# Patient Record
Sex: FEMALE | Race: WHITE | NOT HISPANIC OR LATINO | Employment: OTHER | URBAN - METROPOLITAN AREA
[De-identification: names, ages, dates, MRNs, and addresses within clinical notes are randomized per-mention and may not be internally consistent; named-entity substitution may affect disease eponyms.]

---

## 2020-04-02 ENCOUNTER — TRANSFERRED RECORDS (OUTPATIENT)
Dept: HEALTH INFORMATION MANAGEMENT | Facility: CLINIC | Age: 80
End: 2020-04-02

## 2020-10-06 ENCOUNTER — TRANSFERRED RECORDS (OUTPATIENT)
Dept: HEALTH INFORMATION MANAGEMENT | Facility: CLINIC | Age: 80
End: 2020-10-06

## 2020-10-15 ENCOUNTER — TELEPHONE (OUTPATIENT)
Dept: PSYCHIATRY | Facility: CLINIC | Age: 80
End: 2020-10-15

## 2020-10-15 NOTE — TELEPHONE ENCOUNTER
Who is referring the patient?  at [facility]    What is the indication for ECT? Clinical Depression    Has the patient had ECT before? Yes   Where was your previous ECT done? [place], [place]   When was your previous ECT done? Unknown, possibly 2006   How many sessions was it? Unknown   Was it Bilateral or Unilateral? unknown   How well did the ECT work? Worked really well   Where there any problems? No    How likely are they to want to start ECT after the evaluation? Very Likely    Does the patient have any of the following red flag symptoms? There's a little bit of thoughts of self-harm. No plan or intent    If the patient has not had ECT before, please read the following:   Explain that it is a series of treatments, usually 6-12 done 2-3 times week for 2-4 weeks. No driving during the series of ECT, so must have a ride to and from the hospital and someone that will be with them for several hours after each treatment. All patients will need a physical exam and labs before doing ECT, usually after the evaluation, but if the situation is urgent and the patient is in good general health, they can get instructions about what to do before the ECT eval.    Who will be coming with them to the eval? Son - Reno  All patients are encouraged to bring whoever is likely to be providing most of the care during ECT (driving and staying with patient) to the initial appointment.    During the call, ask briefly about suicidal thinking and remind patient or caregiver that if the patient should be evaluated urgently, go to the ED. If admitted, ECT can be started more quickly than as an outpatient and most patients can be discharged when safe and finish their treatment as an outpatient. If they are admitted to a hospitalist, they should ask the doctor to refer for ECT to Dr. Mcbride.    10/15/20 Intake complete. Sending to  for review. Also sending fax referral and records to scannin pages.     Shayna Buckner, intake  coordinator

## 2021-11-12 ENCOUNTER — TRANSFERRED RECORDS (OUTPATIENT)
Dept: HEALTH INFORMATION MANAGEMENT | Facility: CLINIC | Age: 81
End: 2021-11-12
Payer: MEDICARE

## 2021-11-15 ENCOUNTER — PATIENT OUTREACH (OUTPATIENT)
Dept: ONCOLOGY | Facility: CLINIC | Age: 81
End: 2021-11-15
Payer: MEDICARE

## 2021-11-15 ENCOUNTER — TRANSCRIBE ORDERS (OUTPATIENT)
Dept: OTHER | Age: 81
End: 2021-11-15
Payer: MEDICARE

## 2021-11-15 DIAGNOSIS — C50.912 INVASIVE DUCTAL CARCINOMA OF LEFT BREAST (H): Primary | ICD-10-CM

## 2021-11-15 NOTE — PROGRESS NOTES
New Patient Oncology Nurse Navigator Note     Referring provider: Refer by [facility]      Referring Clinic/Organization: Refer by [facility]      Referred to (specialty: Medical Oncology      Date Referral Received: November 15, 2021     Evaluation for:  Breast cancer     Clinical History (per Nurse review of records provided):    7/22/2021 bilateral screening 3D mammogram: Round mass now presents slightly lower slightly inner portion of left breast.    8/19/2021 ultrasound left breast: Hypoechoic marginated wider than tall shadowing vascular mass at the 8 to 9 o'clock position 3 cm from the nipple 1.9 x 2.2 x 1.1 cm.    9/1/21 left breast biopsy: Invasive mammary carcinoma, maximum size in 1 core is 10 mm, no in situ component. ER+> 90%, PA+> 90%, HER-2 positive IHC 3+.    9/20/2021: Saw Dr. Weaver for surgical consult.    9/28/2021 - Patient met with Dr. Connie Lozano, medical oncologist.  Dr. Lozano recommended patient proceed with surgery and also have radiation therapy.  Dr. Lozano states patient has altered chest anatomy with pectus excavatum.  Dr. Lozano also referred patient to Dr. Valentina Lynch for adjuvant radiation consult.  According to patient's son that did not occur.    10/22/2021: Left simple mastectomy, sentinel lymph node biopsy by Dr. Jennie Weaver. Pathology: 18 mm invasive ductal carcinoma, grade 2. Distance from closest margin 10 mm,3 sentinel lymph nodes negative. Ki-67 7 percent.    11/12/21 - [facility]    11/12/2021 - Met with Dr. Connie Lozano again and discussed adjuvant paclitaxel weekly x12 concurrent with Herceptin for a total of 1 year, as well as adjuvant hormonal therapy.  Dr. Lozano assessed patient as somewhat frail and will not tolerate chemotherapy and Herceptin well.  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  She did have a lot of mental health issues before surgery with depression, was even admitted for depression on October 2 at [facility].      She is moving to assisted living facility in [place] and thus transfer of care to [facility].     Records Location: Care Everywhere     Telephoned and left voice message on Alyssa' mobile number requesting call back at her convenience.

## 2021-11-17 NOTE — PROGRESS NOTES
Telephoned and left voice message at patient's home number requesting call back to assist in scheduling medical oncology consult at our [place] location.      Telephoned and left voice message at patient's mobile number as well, again requesting call back to assist in scheduling medical oncology consult at our [place] location.

## 2021-11-18 NOTE — PROGRESS NOTES
RECORDS STATUS - BREAST    RECORDS REQUESTED FROM: [facility]/ [facility]   DATE REQUESTED: 11/19/2021   NOTES DETAILS STATUS   OFFICE NOTE from referring provider     OFFICE NOTE from medical oncologist Complete 11/12/2021- Malignant neoplasm of upper-inner quadrant of left breast in female, estrogen receptor positive (HCC) (Primary Dx)   OFFICE NOTE from surgeon Complete See Breast Biopsy in EPIC   OFFICE NOTE from radiation oncologist     DISCHARGE SUMMARY from hospital Complete 9/28/2021- Consult- Malignant neoplasm of upper-inner quadrant of left breast in female   DISCHARGE REPORT from the ER     OPERATIVE REPORT Complete See Breast Biopsy in EPIC   MEDICATION LIST     CLINICAL TRIAL TREATMENTS TO DATE     LABS     REQUEST BLOCKS FOR ALL BREAST CANCER PTS     PATHOLOGY REPORTS  (Tissue diagnosis, Stage, ER/CT percentage positive and intensity of staining, HER2 IHC, FISH, and all biopsies from breast and any distant metastasis)                 Requested- Biopsy from 9/1/2021 [facility]   Case: [id number]  Ph: [phone]  Fax: [phone]  Mailing Address:  [address] [place] [address]  [employer] Tracking Number:  [id number] 10/22/2021   B.  Skin, left breast, inferior lateral margin, excision:  Benign skin and subcutaneous tissue with areas of seborrheic keratosis, negative for dysplasia or evidence of neoplasm.     C.  Breast, left, mastectomy:  Invasive mammary carcinoma.  See Synoptic Report.      - Skin with multiple areas of benign keratosis, negative for dysplasia or involvement by mammary carcinoma.      - Unremarkable nipple.     D.  Skin, left breast, lateral margin, excision:  Benign skin and subcutaneous tissue negative for dysplasia or involvement by mammary carcinoma.     9/1/2021   Breast, left, biopsy:  Invasive mammary carcinoma, NOS.       - Maximum size of invasive tumor in one core is 10 mm.      - No in situ component identified.         GENONOMIC TESTING     TYPE:   (Next  Generation Sequencing, including Foundation One testing, and Oncotype score)     IMAGING (NEED IMAGES & REPORT)     CT SCANS     MRI     MAMMO Requested- [facility]     ULTRASOUND Requested- [facility]     PET     BONE SCAN     BRAIN MRI       Action    Action Taken 11/18/2021 2:17pm CATHY     I called [facility] Ph: [phone] #[phone] - they will send breast/chest imaging from [phone]    I faxed over a request for path to [facility]     I called [facility] in  [place] Phone: [phone] to follow up on the image request again..they will push a few images     Most of the imaging is at the [facility] Ph: [phone]- they asked me to fax over a request form.  Fax: [phone]

## 2021-11-18 NOTE — PROGRESS NOTES
Alyssa returned my call and confirms she has moved to assisted living ([facility]) in [place].  She believes she has transportation to medical appointments through [employer] in [place], and states her son Reno typically accompanies her to appointments.  She gave permission for writer to call son to arrange appointment time.      Telephoned son's mobile number and spoke with him about convenient time for consult.  They are available tomorrow and son transferred to [facility]PS to book appointment with Dr. Anguiano for 11/19.

## 2021-11-19 ENCOUNTER — ONCOLOGY VISIT (OUTPATIENT)
Dept: ONCOLOGY | Facility: CLINIC | Age: 81
End: 2021-11-19
Payer: MEDICARE

## 2021-11-19 ENCOUNTER — PRE VISIT (OUTPATIENT)
Dept: ONCOLOGY | Facility: CLINIC | Age: 81
End: 2021-11-19

## 2021-11-19 VITALS
DIASTOLIC BLOOD PRESSURE: 86 MMHG | SYSTOLIC BLOOD PRESSURE: 151 MMHG | OXYGEN SATURATION: 94 % | HEART RATE: 89 BPM | HEIGHT: 60 IN | RESPIRATION RATE: 16 BRPM | BODY MASS INDEX: 26.31 KG/M2 | WEIGHT: 134 LBS

## 2021-11-19 DIAGNOSIS — C50.212 MALIGNANT NEOPLASM OF UPPER-INNER QUADRANT OF LEFT BREAST IN FEMALE, ESTROGEN RECEPTOR POSITIVE (H): Primary | ICD-10-CM

## 2021-11-19 DIAGNOSIS — Z17.0 MALIGNANT NEOPLASM OF UPPER-INNER QUADRANT OF LEFT BREAST IN FEMALE, ESTROGEN RECEPTOR POSITIVE (H): Primary | ICD-10-CM

## 2021-11-19 PROBLEM — Z96.649 HIP JOINT REPLACEMENT BY OTHER MEANS: Status: ACTIVE | Noted: 2021-11-19

## 2021-11-19 PROBLEM — M85.80 OSTEOPENIA: Status: ACTIVE | Noted: 2021-10-05

## 2021-11-19 PROBLEM — I10 ESSENTIAL HYPERTENSION: Status: ACTIVE | Noted: 2018-03-07

## 2021-11-19 PROBLEM — M19.049 PRIMARY LOCALIZED OSTEOARTHROSIS, HAND: Status: ACTIVE | Noted: 2021-11-19

## 2021-11-19 PROBLEM — Z90.12 S/P LEFT MASTECTOMY: Status: ACTIVE | Noted: 2021-10-28

## 2021-11-19 LAB
ALBUMIN SERPL-MCNC: 3.5 G/DL (ref 3.4–5)
ALP SERPL-CCNC: 75 U/L (ref 40–150)
ALT SERPL W P-5'-P-CCNC: 23 U/L (ref 0–50)
ANION GAP SERPL CALCULATED.3IONS-SCNC: 3 MMOL/L (ref 3–14)
AST SERPL W P-5'-P-CCNC: 27 U/L (ref 0–45)
BASOPHILS # BLD AUTO: 0.1 10E3/UL (ref 0–0.2)
BASOPHILS NFR BLD AUTO: 1 %
BILIRUB SERPL-MCNC: 0.3 MG/DL (ref 0.2–1.3)
BUN SERPL-MCNC: 34 MG/DL (ref 7–30)
CALCIUM SERPL-MCNC: 9.1 MG/DL (ref 8.5–10.1)
CHLORIDE BLD-SCNC: 104 MMOL/L (ref 94–109)
CO2 SERPL-SCNC: 33 MMOL/L (ref 20–32)
CREAT SERPL-MCNC: 0.9 MG/DL (ref 0.52–1.04)
EOSINOPHIL # BLD AUTO: 0.3 10E3/UL (ref 0–0.7)
EOSINOPHIL NFR BLD AUTO: 3 %
ERYTHROCYTE [DISTWIDTH] IN BLOOD BY AUTOMATED COUNT: 13.3 % (ref 10–15)
GFR SERPL CREATININE-BSD FRML MDRD: 60 ML/MIN/1.73M2
GLUCOSE BLD-MCNC: 92 MG/DL (ref 70–99)
HCT VFR BLD AUTO: 38 % (ref 35–47)
HGB BLD-MCNC: 12.4 G/DL (ref 11.7–15.7)
HOLD SPECIMEN: NORMAL
IMM GRANULOCYTES # BLD: 0 10E3/UL
IMM GRANULOCYTES NFR BLD: 1 %
LYMPHOCYTES # BLD AUTO: 1.7 10E3/UL (ref 0.8–5.3)
LYMPHOCYTES NFR BLD AUTO: 20 %
MCH RBC QN AUTO: 30.6 PG (ref 26.5–33)
MCHC RBC AUTO-ENTMCNC: 32.6 G/DL (ref 31.5–36.5)
MCV RBC AUTO: 94 FL (ref 78–100)
MONOCYTES # BLD AUTO: 1 10E3/UL (ref 0–1.3)
MONOCYTES NFR BLD AUTO: 11 %
NEUTROPHILS # BLD AUTO: 5.7 10E3/UL (ref 1.6–8.3)
NEUTROPHILS NFR BLD AUTO: 64 %
NRBC # BLD AUTO: 0 10E3/UL
NRBC BLD AUTO-RTO: 0 /100
PLATELET # BLD AUTO: 161 10E3/UL (ref 150–450)
POTASSIUM BLD-SCNC: 4.5 MMOL/L (ref 3.4–5.3)
PROT SERPL-MCNC: 7.1 G/DL (ref 6.8–8.8)
RBC # BLD AUTO: 4.05 10E6/UL (ref 3.8–5.2)
SODIUM SERPL-SCNC: 140 MMOL/L (ref 133–144)
WBC # BLD AUTO: 8.7 10E3/UL (ref 4–11)

## 2021-11-19 PROCEDURE — 80053 COMPREHEN METABOLIC PANEL: CPT | Performed by: INTERNAL MEDICINE

## 2021-11-19 PROCEDURE — 85025 COMPLETE CBC W/AUTO DIFF WBC: CPT | Performed by: INTERNAL MEDICINE

## 2021-11-19 PROCEDURE — 36415 COLL VENOUS BLD VENIPUNCTURE: CPT | Performed by: INTERNAL MEDICINE

## 2021-11-19 PROCEDURE — 99204 OFFICE O/P NEW MOD 45 MIN: CPT | Performed by: INTERNAL MEDICINE

## 2021-11-19 RX ORDER — ACETAMINOPHEN 325 MG/1
650 TABLET ORAL
COMMUNITY
Start: 2021-10-29

## 2021-11-19 RX ORDER — ONDANSETRON 2 MG/ML
8 INJECTION INTRAMUSCULAR; INTRAVENOUS EVERY 6 HOURS PRN
Status: CANCELLED
Start: 2021-12-27

## 2021-11-19 RX ORDER — FUROSEMIDE 20 MG
10 TABLET ORAL
COMMUNITY
Start: 2021-07-21

## 2021-11-19 RX ORDER — HEPARIN SODIUM,PORCINE 10 UNIT/ML
5 VIAL (ML) INTRAVENOUS
Status: CANCELLED | OUTPATIENT
Start: 2021-12-27

## 2021-11-19 RX ORDER — MIRTAZAPINE 15 MG/1
TABLET, FILM COATED ORAL
COMMUNITY
Start: 2021-11-15

## 2021-11-19 RX ORDER — DIPHENHYDRAMINE HCL 25 MG
50 CAPSULE ORAL ONCE
Status: CANCELLED
Start: 2021-12-27

## 2021-11-19 RX ORDER — HEPARIN SODIUM (PORCINE) LOCK FLUSH IV SOLN 100 UNIT/ML 100 UNIT/ML
5 SOLUTION INTRAVENOUS
Status: CANCELLED | OUTPATIENT
Start: 2021-12-27

## 2021-11-19 RX ORDER — AMLODIPINE BESYLATE 5 MG/1
TABLET ORAL
COMMUNITY
Start: 2020-11-09

## 2021-11-19 RX ORDER — ACETAMINOPHEN 325 MG/1
650 TABLET ORAL ONCE
Status: CANCELLED | OUTPATIENT
Start: 2021-12-27

## 2021-11-19 RX ORDER — METHYLPREDNISOLONE SODIUM SUCCINATE 125 MG/2ML
125 INJECTION, POWDER, LYOPHILIZED, FOR SOLUTION INTRAMUSCULAR; INTRAVENOUS
Status: CANCELLED
Start: 2021-12-27

## 2021-11-19 RX ORDER — EPINEPHRINE 1 MG/ML
0.3 INJECTION, SOLUTION INTRAMUSCULAR; SUBCUTANEOUS EVERY 5 MIN PRN
Status: CANCELLED | OUTPATIENT
Start: 2021-12-27

## 2021-11-19 RX ORDER — MULTIVIT-MIN/FOLIC ACID/BIOTIN 400-400MCG
1 CAPSULE ORAL DAILY
COMMUNITY
Start: 2020-11-09

## 2021-11-19 RX ORDER — ALBUTEROL SULFATE 0.83 MG/ML
2.5 SOLUTION RESPIRATORY (INHALATION)
Status: CANCELLED | OUTPATIENT
Start: 2021-12-27

## 2021-11-19 RX ORDER — VENLAFAXINE HYDROCHLORIDE 150 MG/1
300 CAPSULE, EXTENDED RELEASE ORAL
COMMUNITY
Start: 2020-11-17

## 2021-11-19 RX ORDER — NALOXONE HYDROCHLORIDE 0.4 MG/ML
0.2 INJECTION, SOLUTION INTRAMUSCULAR; INTRAVENOUS; SUBCUTANEOUS
Status: CANCELLED | OUTPATIENT
Start: 2021-12-27

## 2021-11-19 RX ORDER — RALOXIFENE HYDROCHLORIDE 60 MG/1
60 TABLET, FILM COATED ORAL
COMMUNITY
Start: 2020-09-14 | End: 2024-01-11

## 2021-11-19 RX ORDER — ALBUTEROL SULFATE 90 UG/1
1-2 AEROSOL, METERED RESPIRATORY (INHALATION)
Status: CANCELLED
Start: 2021-12-27

## 2021-11-19 RX ORDER — DIPHENHYDRAMINE HYDROCHLORIDE 50 MG/ML
50 INJECTION INTRAMUSCULAR; INTRAVENOUS
Status: CANCELLED
Start: 2021-12-27

## 2021-11-19 RX ORDER — MEPERIDINE HYDROCHLORIDE 25 MG/ML
25 INJECTION INTRAMUSCULAR; INTRAVENOUS; SUBCUTANEOUS EVERY 30 MIN PRN
Status: CANCELLED | OUTPATIENT
Start: 2021-12-27

## 2021-11-19 RX ORDER — AMOXICILLIN 500 MG/1
CAPSULE ORAL
COMMUNITY
Start: 2020-07-21

## 2021-11-19 RX ORDER — ARIPIPRAZOLE 5 MG/1
2.5 TABLET ORAL
COMMUNITY
Start: 2021-09-20

## 2021-11-19 ASSESSMENT — PAIN SCALES - GENERAL: PAINLEVEL: NO PAIN (0)

## 2021-11-19 ASSESSMENT — MIFFLIN-ST. JEOR: SCORE: 994.32

## 2021-11-19 NOTE — PROGRESS NOTES
[facility] PHYS[facility]IANS  MEDICAL ONCOLOGY    NEW PATIENT VISIT NOTE    Reason for consultation: breast cancer    Referring Provider: self referred    Oncology Treatment Summary  1. 7/22/21 screening mammogram with Left breast mass in the LIQ  2. 8/19/21 ultrasound with 1.9 x 2.2 1.1 cm mass, bx invasive mammary cancer  ER90%+ NM 90%+ Her2 grisel 3+ IHC  3. 10/22/21 Left mastectomy 18 mm IDC G2, margins negative, 0/3 SLN. Ki 67 7%  4. Germline genetic testing done in [place], results pending.  5. Echo in [place] done with EF 70%     HISTORY OF PRESENTING ILLNESS  Pleasant 81 year old presents with son to discuss her newly dx breast cancer. She has met with Dr Larry Michaels in [place] and therapy has been recommended. She moved to the [facility] to live in assisted living and would like to receive therapy here.  She is active at the [facility]. She goes to meals. She plays piano. She is not terribly physically active. She uses a walker. No pain. No cough or shortness of breath.     PAST MEDICAL HISTORY  Depression - major, osteopenia, pectus excavatum, RIGHT THR      CURRENT OUTPATIENT MEDICATIONS  Current Outpatient Medications   Medication     acetaminophen (TYLENOL) 325 MG tablet     amLODIPine (NORVASC) 5 MG tablet     amoxicillin (AMOXIL) 500 MG capsule     ARIPiprazole (ABILIFY) 5 MG tablet     calcium carbonate 600 mg-vitamin D 400 units (CALTRATE) 600-400 MG-UNIT per tablet     cholecalciferol 25 MCG (1000 UT) TABS     furosemide (LASIX) 20 MG tablet     mirtazapine (REMERON) 15 MG tablet     Multiple Vitamins-Minerals (OCUVITE-LUTEIN PO)     raloxifene (EVISTA) 60 MG tablet     Specialty Vitamins Products (VITAMINS FOR HAIR) CAPS     venlafaxine (EFFEXOR-XR) 150 MG 24 hr capsule     No current facility-administered medications for this visit.        ALLERGIES   No Known Allergies     SOCIAL HISTORY  , recently moved to area. 3 children Reno, Leonila, Norma. 6 grand kids. No tobacco rare  etoh.     FAMILY HISTORY  See genetic counselor note.     REVIEW OF SYSTEMS  Review Of Systems  Skin: negative  Eyes: negative  Ears/Nose/Throat: negative  Respiratory: No shortness of breath, dyspnea on exertion, cough, or hemoptysis  Cardiovascular: negative  Gastrointestinal: negative  Genitourinary: negative  Musculoskeletal: negative  Neurologic: negative  Psychiatric: negative  Hematologic/Lymphatic/Immunologic: negative  Endocrine: negative      PHYSICAL EXAM  B/P: 151/86, T: Data Unavailable, P: 89, R: 16  Wt Readings from Last 3 Encounters:   11/19/21 60.8 kg (134 lb)       ECOG PPS1        Physical Exam  Vitals reviewed.   Constitutional:       Appearance: Normal appearance. She is normal weight.   HENT:      Head: Normocephalic and atraumatic.   Eyes:      Extraocular Movements: Extraocular movements intact.      Conjunctiva/sclera: Conjunctivae normal.      Pupils: Pupils are equal, round, and reactive to light.   Pulmonary:      Effort: Pulmonary effort is normal.   Skin:     General: Skin is warm.   Neurological:      General: No focal deficit present.      Mental Status: She is alert and oriented to person, place, and time. Mental status is at baseline.   Psychiatric:         Behavior: Behavior normal.         Thought Content: Thought content normal.         Judgment: Judgment normal.              LABORATORY AND IMAGING STUDIES  Recent Labs   Lab Test 11/19/21  1230      POTASSIUM 4.5   CHLORIDE 104     No results for input(s): MAG, PHOS in the last 40430 hours.  Recent Labs   Lab Test 11/19/21  1230   WBC 8.7   HGB 12.4      MCV 94   NEUTROPHIL 64     No results for input(s): BILITOTAL, ALKPHOS, ALT, AST, ALBUMIN, LDH in the last 67903 hours.  No results found for: TSH  No results for input(s): CEA in the last 90541 hours.  No results found for this or any previous visit.     ASSESSMENT  AND RECOMMENDATIONS:    IMP    1. [id number] ER+CA+[id number] breast cancer of the left breast  s/p mastectomy  2. Major Depression      Plan    I discussed treatment options with her and her son. I would concur with Dr Marques regarding treatment.    She has had a mastectomy and does not need post mastectomy radiation.    Given her T1c Her2+ cancer I would recommend adjuvant taxol + herceptin for 12 weeks followed by herceptin for 1 year. We discussed risks, benefits. Alternative and side effects and she does wish to proceed. At the end of taxol we will discuss antiestrogen therapy. She has had a echocardiogram with EF 70%. She will need a port. We will see her every other week of chemotherapy. Labs will be done today.    Marjorie Anguiano MD on 11/19/2021 at 2:47 PM

## 2021-11-19 NOTE — LETTER
11/19/2021         RE: Alyssa Aparicio  [address]  [place] [facility] [address]        Dear Colleague,    Thank you for referring your patient, Alyssa Aparicio, to the [facility]. Please see a copy of my visit note below.    [facility] PHYSICIANS  MEDICAL ONCOLOGY    NEW PATIENT VISIT NOTE    Reason for consultation: breast cancer    Referring Provider: self referred    Oncology Treatment Summary  1. 7/22/21 screening mammogram with Left breast mass in the LIQ  2. 8/19/21 ultrasound with 1.9 x 2.2 1.1 cm mass, bx invasive mammary cancer  ER90%+ WA 90%+ Her2 grisel 3+ IHC  3. 10/22/21 Left mastectomy 18 mm IDC G2, margins negative, 0/3 SLN. Ki 67 7%  4. Germline genetic testing done in [place], results pending.  5. Echo in [place] done with EF 70%     HISTORY OF PRESENTING ILLNESS  Pleasant 81 year old presents with son to discuss her newly dx breast cancer. She has met with Dr Larry Michaels in [place] and therapy has been recommended. She moved to the  area to live in assisted living and would like to receive therapy here.  She is active at the [facility]. She goes to meals. She plays piano. She is not terribly physically active. She uses a walker. No pain. No cough or shortness of breath.     PAST MEDICAL HISTORY  Depression - major, osteopenia, pectus excavatum, RIGHT THR      CURRENT OUTPATIENT MEDICATIONS  Current Outpatient Medications   Medication     acetaminophen (TYLENOL) 325 MG tablet     amLODIPine (NORVASC) 5 MG tablet     amoxicillin (AMOXIL) 500 MG capsule     ARIPiprazole (ABILIFY) 5 MG tablet     calcium carbonate 600 mg-vitamin D 400 units (CALTRATE) 600-400 MG-UNIT per tablet     cholecalciferol 25 MCG (1000 UT) TABS     furosemide (LASIX) 20 MG tablet     mirtazapine (REMERON) 15 MG tablet     Multiple Vitamins-Minerals (OCUVITE-LUTEIN PO)     raloxifene (EVISTA) 60 MG tablet     Specialty Vitamins Products  (VITAMINS FOR HAIR) CAPS     venlafaxine (EFFEXOR-XR) 150 MG 24 hr capsule     No current facility-administered medications for this visit.        ALLERGIES   No Known Allergies     SOCIAL HISTORY  , recently moved to area. 3 children Reno, Norma Keen. 6 grand kids. No tobacco rare etoh.     FAMILY HISTORY  See genetic counselor note.     REVIEW OF SYSTEMS  Review Of Systems  Skin: negative  Eyes: negative  Ears/Nose/Throat: negative  Respiratory: No shortness of breath, dyspnea on exertion, cough, or hemoptysis  Cardiovascular: negative  Gastrointestinal: negative  Genitourinary: negative  Musculoskeletal: negative  Neurologic: negative  Psychiatric: negative  Hematologic/Lymphatic/Immunologic: negative  Endocrine: negative      PHYSICAL EXAM  B/P: 151/86, T: Data Unavailable, P: 89, R: 16  Wt Readings from Last 3 Encounters:   11/19/21 60.8 kg (134 lb)       ECOG PPS1        Physical Exam  Vitals reviewed.   Constitutional:       Appearance: Normal appearance. She is normal weight.   HENT:      Head: Normocephalic and atraumatic.   Eyes:      Extraocular Movements: Extraocular movements intact.      Conjunctiva/sclera: Conjunctivae normal.      Pupils: Pupils are equal, round, and reactive to light.   Pulmonary:      Effort: Pulmonary effort is normal.   Skin:     General: Skin is warm.   Neurological:      General: No focal deficit present.      Mental Status: She is alert and oriented to person, place, and time. Mental status is at baseline.   Psychiatric:         Behavior: Behavior normal.         Thought Content: Thought content normal.         Judgment: Judgment normal.              LABORATORY AND IMAGING STUDIES  Recent Labs   Lab Test 11/19/21  1230      POTASSIUM 4.5   CHLORIDE 104     No results for input(s): MAG, PHOS in the last 90657 hours.  Recent Labs   Lab Test 11/19/21  1230   WBC 8.7   HGB 12.4      MCV 94   NEUTROPHIL 64     No results for input(s): BILITOTAL, ALKPHOS, ALT,  AST, ALBUMIN, LDH in the last 98792 hours.  No results found for: TSH  No results for input(s): CEA in the last 66953 hours.  No results found for this or any previous visit.     ASSESSMENT  AND RECOMMENDATIONS:    IMP    1. [id number] ER+WA+[id number] breast cancer of the left breast s/p mastectomy  2. Major Depression      Plan    I discussed treatment options with her and her son. I would concur with Dr Marques regarding treatment.    She has had a mastectomy and does not need post mastectomy radiation.    Given her T1c Her2+ cancer I would recommend adjuvant taxol + herceptin for 12 weeks followed by herceptin for 1 year. We discussed risks, benefits. Alternative and side effects and she does wish to proceed. At the end of taxol we will discuss antiestrogen therapy. She has had a echocardiogram with EF 70%. She will need a port. We will see her every other week of chemotherapy. Labs will be done today.    Marjorie Anguiano MD on 11/19/2021 at 2:47 PM              Again, thank you for allowing me to participate in the care of your patient.        Sincerely,        Marjorie Anguiano MD

## 2021-11-19 NOTE — NURSING NOTE
Oncology Rooming Note    November 19, 2021 11:23 AM   Alyssa Aparicio is a 81 year old female who presents for:    Chief Complaint   Patient presents with     Oncology Clinic Visit     New patient     Initial Vitals: BP (!) 151/86 (BP Location: Left arm)   Pulse 89   Resp 16   Ht 1.524 m (5')   Wt 60.8 kg (134 lb)   SpO2 94%   BMI 26.17 kg/m   Estimated body mass index is 26.17 kg/m  as calculated from the following:    Height as of this encounter: 1.524 m (5').    Weight as of this encounter: 60.8 kg (134 lb). Body surface area is 1.6 meters squared.  No Pain (0) Comment: Data Unavailable   No LMP recorded. Patient is postmenopausal.  Allergies reviewed: Yes  Medications reviewed: Yes    Medications: Medication refills not needed today.  Pharmacy name entered into EPIC: Data Unavailable    Clinical concerns: New Patient       Anabelle Redd LPN

## 2021-11-24 ENCOUNTER — LAB (OUTPATIENT)
Dept: LAB | Facility: CLINIC | Age: 81
End: 2021-11-24
Payer: MEDICARE

## 2021-11-24 DIAGNOSIS — Z17.0 MALIGNANT NEOPLASM OF UPPER-INNER QUADRANT OF LEFT BREAST IN FEMALE, ESTROGEN RECEPTOR POSITIVE (H): Primary | ICD-10-CM

## 2021-11-24 DIAGNOSIS — C50.212 MALIGNANT NEOPLASM OF UPPER-INNER QUADRANT OF LEFT BREAST IN FEMALE, ESTROGEN RECEPTOR POSITIVE (H): Primary | ICD-10-CM

## 2021-11-24 PROCEDURE — 88323 CONSLTJ&REPRT MATRL PREP SLD: CPT | Mod: TC

## 2021-11-24 PROCEDURE — 88321 CONSLTJ&REPRT SLD PREP ELSWR: CPT | Performed by: PATHOLOGY

## 2021-12-03 ENCOUNTER — ONCOLOGY VISIT (OUTPATIENT)
Dept: ONCOLOGY | Facility: CLINIC | Age: 81
End: 2021-12-03
Payer: MEDICARE

## 2021-12-03 VITALS
RESPIRATION RATE: 16 BRPM | BODY MASS INDEX: 27.05 KG/M2 | HEART RATE: 86 BPM | SYSTOLIC BLOOD PRESSURE: 154 MMHG | WEIGHT: 137.8 LBS | TEMPERATURE: 97.9 F | DIASTOLIC BLOOD PRESSURE: 77 MMHG | HEIGHT: 60 IN | OXYGEN SATURATION: 95 %

## 2021-12-03 DIAGNOSIS — E28.39 ESTROGEN DEFICIENCY: Primary | ICD-10-CM

## 2021-12-03 DIAGNOSIS — Z17.0 MALIGNANT NEOPLASM OF UPPER-INNER QUADRANT OF LEFT BREAST IN FEMALE, ESTROGEN RECEPTOR POSITIVE (H): ICD-10-CM

## 2021-12-03 DIAGNOSIS — C50.212 MALIGNANT NEOPLASM OF UPPER-INNER QUADRANT OF LEFT BREAST IN FEMALE, ESTROGEN RECEPTOR POSITIVE (H): ICD-10-CM

## 2021-12-03 PROCEDURE — 99214 OFFICE O/P EST MOD 30 MIN: CPT | Performed by: INTERNAL MEDICINE

## 2021-12-03 RX ORDER — ANASTROZOLE 1 MG/1
1 TABLET ORAL DAILY
Qty: 90 TABLET | Refills: 3 | Status: SHIPPED | OUTPATIENT
Start: 2021-12-03 | End: 2022-12-20

## 2021-12-03 ASSESSMENT — MIFFLIN-ST. JEOR: SCORE: 1011.56

## 2021-12-03 ASSESSMENT — PAIN SCALES - GENERAL: PAINLEVEL: NO PAIN (0)

## 2021-12-03 NOTE — NURSING NOTE
Oncology Rooming Note    December 3, 2021 9:34 AM   Aylssa Aparicio is a 81 year old female who presents for:    Chief Complaint   Patient presents with     Oncology Clinic Visit     Follow up     Initial Vitals: BP (!) 154/77 (BP Location: Right arm, Patient Position: Sitting, Cuff Size: Adult Regular)   Pulse 86   Temp 97.9  F (36.6  C) (Oral)   Resp 16   Ht 1.524 m (5')   Wt 62.5 kg (137 lb 12.8 oz)   SpO2 95%   BMI 26.91 kg/m   Estimated body mass index is 26.91 kg/m  as calculated from the following:    Height as of this encounter: 1.524 m (5').    Weight as of this encounter: 62.5 kg (137 lb 12.8 oz). Body surface area is 1.63 meters squared.  No Pain (0) Comment: Data Unavailable   No LMP recorded. Patient is postmenopausal.  Allergies reviewed: Yes  Medications reviewed: Yes    Medications: Medication refills not needed today.  Pharmacy name entered into [facility]: [facility] [place] - [address]    Clinical concerns: Treatment plan? Dr. Anguiano was notified.      Karissa Rangel, [facility]

## 2021-12-03 NOTE — LETTER
12/3/2021         RE: Alyssa Aparicio  [address]  [facility] [address]        Dear Colleague,    Thank you for referring your patient, Alyssa Aparicio, to the [facility] CANCER [facility]. Please see a copy of my visit note below.    [facility] PHYSICIANS  MEDICAL ONCOLOGY    RETURN PATIENT VISIT NOTE    Reason for visit: breast cancer    Oncology Treatment Summary  1. 7/22/21 screening mammogram with Left breast mass in the LIQ  2. 8/19/21 ultrasound with 1.9 x 2.2 1.1 cm mass, bx invasive mammary cancer  ER90%+ [place] 90%+ Her2 grisel 3+ IHC  3. 10/22/21 Left mastectomy 18 mm IDC G2, margins negative, 0/3 SLN. Ki 67 7%  4. Germline genetic testing done in [place], results pending.  5. Echo in [place] done with EF 70%     HISTORY OF PRESENTING ILLNESS  Pleasant 81 year old presents with her daughter in law. They wish to rediscuss options for treatment as they had a full discussion in [place] and had been thinking of doing just arimidex. She is concerned about ability to tolerate side effects given her age and depression.    She feels as though she is currently doing quite well since moving into assisted living.     PAST MEDICAL HISTORY  Depression - major, osteopenia, pectus excavatum, RIGHT THR      CURRENT OUTPATIENT MEDICATIONS  Current Outpatient Medications   Medication     acetaminophen (TYLENOL) 325 MG tablet     amLODIPine (NORVASC) 5 MG tablet     ARIPiprazole (ABILIFY) 5 MG tablet     calcium carbonate 600 mg-vitamin D 400 units (CALTRATE) 600-400 MG-UNIT per tablet     cholecalciferol 25 MCG (1000 UT) TABS     furosemide (LASIX) 20 MG tablet     mirtazapine (REMERON) 15 MG tablet     Multiple Vitamins-Minerals (OCUVITE-LUTEIN PO)     raloxifene (EVISTA) 60 MG tablet     Specialty Vitamins Products (VITAMINS FOR HAIR) CAPS     venlafaxine (EFFEXOR-XR) 150 MG 24 hr capsule     amoxicillin (AMOXIL) 500 MG capsule     No current  facility-administered medications for this visit.        ALLERGIES   No Known Allergies     SOCIAL HISTORY  , recently moved to area. 3 children Reno, Leonila, Norma. 6 grand kids. No tobacco rare etoh.     FAMILY HISTORY  See genetic counselor note.     REVIEW OF SYSTEMS  Review Of Systems  Skin: negative  Eyes: negative  Ears/Nose/Throat: negative  Respiratory: No shortness of breath, dyspnea on exertion, cough, or hemoptysis  Cardiovascular: negative  Gastrointestinal: negative  Genitourinary: negative  Musculoskeletal: negative  Neurologic: negative  Psychiatric: negative  Hematologic/Lymphatic/Immunologic: negative  Endocrine: negative      PHYSICAL EXAM  B/P: 151/86, T: Data Unavailable, P: 89, R: 16  Wt Readings from Last 3 Encounters:   12/03/21 62.5 kg (137 lb 12.8 oz)   11/19/21 60.8 kg (134 lb)       ECOG PPS1        Physical Exam  Vitals reviewed.   Constitutional:       Appearance: Normal appearance. She is normal weight.   HENT:      Head: Normocephalic and atraumatic.   Eyes:      Extraocular Movements: Extraocular movements intact.      Conjunctiva/sclera: Conjunctivae normal.      Pupils: Pupils are equal, round, and reactive to light.   Pulmonary:      Effort: Pulmonary effort is normal.   Skin:     General: Skin is warm.   Neurological:      General: No focal deficit present.      Mental Status: She is alert and oriented to person, place, and time. Mental status is at baseline.   Psychiatric:         Behavior: Behavior normal.         Thought Content: Thought content normal.         Judgment: Judgment normal.              LABORATORY AND IMAGING STUDIES  Recent Labs   Lab Test 11/19/21  1230      POTASSIUM 4.5   CHLORIDE 104   CO2 33*   ANIONGAP 3   BUN 34*   CR 0.90   GLC 92   DANELLE 9.1     No results for input(s): MAG, PHOS in the last 13365 hours.  Recent Labs   Lab Test 11/19/21  1230   WBC 8.7   HGB 12.4      MCV 94   NEUTROPHIL 64     Recent Labs   Lab Test 11/19/21  1230    BILITOTAL 0.3   ALKPHOS 75   ALT 23   AST 27   ALBUMIN 3.5     No results found for: TSH  No results for input(s): CEA in the last 21112 hours.  No results found for this or any previous visit.     ASSESSMENT  AND RECOMMENDATIONS:    IMP    1. [id number] ER+IL+[id number] breast cancer of the left breast s/p mastectomy  2. Major Depression      Plan    We again discussed the options with standard of care being weekly taxol with herceptin followed by completion of 1 year of herceptin and adjuvant arimidex vs not going forward with chemotherapy/herceptin and going onto arimidex alone. We reviewed that there is a survival advantage to chemotherapy and herceptin over arimdex alone and reviewed side effects.    At the end of our discussion the patient would be interested in arimidex alone. I gave her a RX for this. She will need a baseline bone density scan as the last was in 2016. She will see me in 3 months for follow-up.    Marjorie Anguiano MD on 12/3/2021 at 11:27 AM              Again, thank you for allowing me to participate in the care of your patient.        Sincerely,        Marjorie Anguiano MD

## 2021-12-03 NOTE — PROGRESS NOTES
[facility] PHYSICIANS  MEDICAL ONCOLOGY    RETURN PATIENT VISIT NOTE    Reason for visit: breast cancer    Oncology Treatment Summary  1. 7/22/21 screening mammogram with Left breast mass in the LIQ  2. 8/19/21 ultrasound with 1.9 x 2.2 1.1 cm mass, bx invasive mammary cancer  ER90%+ [place] 90%+ Her2 grisel 3+ IHC  3. 10/22/21 Left mastectomy 18 mm IDC G2, margins negative, 0/3 SLN. Ki 67 7%  4. Germline genetic testing done in [place], results pending.  5. Echo in [place] done with EF 70%     HISTORY OF PRESENTING ILLNESS  Pleasant 81 year old presents with her daughter in law. They wish to rediscuss options for treatment as they had a full discussion in [place] and had been thinking of doing just arimidex. She is concerned about ability to tolerate side effects given her age and depression.    She feels as though she is currently doing quite well since moving into assisted living.     PAST MEDICAL HISTORY  Depression - major, osteopenia, pectus excavatum, RIGHT THR      CURRENT OUTPATIENT MEDICATIONS  Current Outpatient Medications   Medication     acetaminophen (TYLENOL) 325 MG tablet     amLODIPine (NORVASC) 5 MG tablet     ARIPiprazole (ABILIFY) 5 MG tablet     calcium carbonate 600 mg-vitamin D 400 units (CALTRATE) 600-400 MG-UNIT per tablet     cholecalciferol 25 MCG (1000 UT) TABS     furosemide (LASIX) 20 MG tablet     mirtazapine (REMERON) 15 MG tablet     Multiple Vitamins-Minerals (OCUVITE-LUTEIN PO)     raloxifene (EVISTA) 60 MG tablet     Specialty Vitamins Products (VITAMINS FOR HAIR) CAPS     venlafaxine (EFFEXOR-XR) 150 MG 24 hr capsule     amoxicillin (AMOXIL) 500 MG capsule     No current facility-administered medications for this visit.        ALLERGIES   No Known Allergies     SOCIAL HISTORY  , recently moved to area. 3 children Reno, Leonila, Norma. 6 grand kids. No tobacco rare etoh.     FAMILY HISTORY  See genetic counselor note.     REVIEW OF SYSTEMS  Review Of  Systems  Skin: negative  Eyes: negative  Ears/Nose/Throat: negative  Respiratory: No shortness of breath, dyspnea on exertion, cough, or hemoptysis  Cardiovascular: negative  Gastrointestinal: negative  Genitourinary: negative  Musculoskeletal: negative  Neurologic: negative  Psychiatric: negative  Hematologic/Lymphatic/Immunologic: negative  Endocrine: negative      PHYSICAL EXAM  B/P: 151/86, T: Data Unavailable, P: 89, R: 16  Wt Readings from Last 3 Encounters:   12/03/21 62.5 kg (137 lb 12.8 oz)   11/19/21 60.8 kg (134 lb)       ECOG PPS1        Physical Exam  Vitals reviewed.   Constitutional:       Appearance: Normal appearance. She is normal weight.   HENT:      Head: Normocephalic and atraumatic.   Eyes:      Extraocular Movements: Extraocular movements intact.      Conjunctiva/sclera: Conjunctivae normal.      Pupils: Pupils are equal, round, and reactive to light.   Pulmonary:      Effort: Pulmonary effort is normal.   Skin:     General: Skin is warm.   Neurological:      General: No focal deficit present.      Mental Status: She is alert and oriented to person, place, and time. Mental status is at baseline.   Psychiatric:         Behavior: Behavior normal.         Thought Content: Thought content normal.         Judgment: Judgment normal.              LABORATORY AND IMAGING STUDIES  Recent Labs   Lab Test 11/19/21  1230      POTASSIUM 4.5   CHLORIDE 104   CO2 33*   ANIONGAP 3   BUN 34*   CR 0.90   GLC 92   DANELLE 9.1     No results for input(s): MAG, PHOS in the last 24239 hours.  Recent Labs   Lab Test 11/19/21  1230   WBC 8.7   HGB 12.4      MCV 94   NEUTROPHIL 64     Recent Labs   Lab Test 11/19/21  1230   BILITOTAL 0.3   ALKPHOS 75   ALT 23   AST 27   ALBUMIN 3.5     No results found for: TSH  No results for input(s): CEA in the last 59988 hours.  No results found for this or any previous visit.     ASSESSMENT  AND RECOMMENDATIONS:    IMP    1. [id number] ER+CT+[id number] breast cancer  of the left breast s/p mastectomy  2. Major Depression      Plan    We again discussed the options with standard of care being weekly taxol with herceptin followed by completion of 1 year of herceptin and adjuvant arimidex vs not going forward with chemotherapy/herceptin and going onto arimidex alone. We reviewed that there is a survival advantage to chemotherapy and herceptin over arimdex alone and reviewed side effects.    At the end of our discussion the patient would be interested in arimidex alone. I gave her a RX for this. She will need a baseline bone density scan as the last was in 2016. She will see me in 3 months for follow-up.    Marjorie Anguiano MD on 12/3/2021 at 11:27 AM

## 2021-12-05 ENCOUNTER — HEALTH MAINTENANCE LETTER (OUTPATIENT)
Age: 81
End: 2021-12-05

## 2021-12-15 ENCOUNTER — PATIENT OUTREACH (OUTPATIENT)
Dept: ONCOLOGY | Facility: CLINIC | Age: 81
End: 2021-12-15
Payer: MEDICARE

## 2021-12-20 NOTE — PROGRESS NOTES
Genetic testing completed at [facility].  Report received on 12/14/21, and will be added to her medical record.

## 2021-12-22 PROCEDURE — 88377 M/PHMTRC ALYS ISHQUANT/SEMIQ: CPT | Mod: 26 | Performed by: MEDICAL GENETICS

## 2021-12-23 PROCEDURE — 88377 M/PHMTRC ALYS ISHQUANT/SEMIQ: CPT | Performed by: INTERNAL MEDICINE

## 2021-12-24 LAB
INTERPRETATION: NORMAL
PATH REPORT.COMMENTS IMP SPEC: NORMAL
PATH REPORT.FINAL DX SPEC: NORMAL
PATH REPORT.GROSS SPEC: NORMAL
PATH REPORT.MICROSCOPIC SPEC OTHER STN: NORMAL
PATH REPORT.RELEVANT HX SPEC: NORMAL
PATH REPORT.RELEVANT HX SPEC: NORMAL
PATH REPORT.SITE OF ORIGIN SPEC: NORMAL

## 2021-12-24 PROCEDURE — 2894A VOIDCORRECT: CPT | Performed by: PATHOLOGY

## 2021-12-24 PROCEDURE — 88342 IMHCHEM/IMCYTCHM 1ST ANTB: CPT | Mod: TC | Performed by: INTERNAL MEDICINE

## 2021-12-24 PROCEDURE — 2894A PATHOLOGY CONSULT: CPT | Mod: XS | Performed by: PATHOLOGY

## 2022-01-03 ENCOUNTER — ANCILLARY PROCEDURE (OUTPATIENT)
Dept: BONE DENSITY | Facility: CLINIC | Age: 82
End: 2022-01-03
Attending: INTERNAL MEDICINE
Payer: MEDICARE

## 2022-01-03 DIAGNOSIS — E28.39 ESTROGEN DEFICIENCY: ICD-10-CM

## 2022-01-03 DIAGNOSIS — M81.8 OTHER OSTEOPOROSIS WITHOUT CURRENT PATHOLOGICAL FRACTURE: ICD-10-CM

## 2022-01-03 PROCEDURE — 77081 DXA BONE DENSITY APPENDICULR: CPT | Mod: 59 | Performed by: RADIOLOGY

## 2022-01-03 PROCEDURE — 77080 DXA BONE DENSITY AXIAL: CPT | Performed by: RADIOLOGY

## 2022-01-04 NOTE — PROGRESS NOTES
Oncology Follow Up Visit: January 5, 2022    Oncologist: Dr Marjorie Anguiano  PCP: Abstract, Provider    Diagnosis: Left Breast Cancer  Alyssa Aparicio is an 82 yo female breast cancer found with 7/22/21 screening mammogram with Left breast mass in the LIQ  8/19/21 ultrasound with 1.9 x 2.2 1.1 cm mass, bx invasive mammary cancer  ER90%+ [place] 90%+ Her2 grisel 3+ IHC  *Germline genetic testing done in [place], results pending.  Treatment:   10/22/21 Left mastectomy 18 mm IDC G2, margins negative, 0/3 SLN. Ki 67 7%  -Echo in [place] done with EF 70%  12/3/2021 began Arimidex    Interval History: Ms. Aparicio comes to clinic with son for review of start of AI use with Arimidex to treat her breast cancer. Pt has chosen not to proceed with Taxol/Herceptin and treat only with aromatase inhibitor - began Arimidex in 12/2021. Pt has not noted any new symptoms related to the medication- she has nursing service from her assisted living and they provide medication administration so she is not sure what she has been taking but list reports she is on the Arimidex and a calcium plus vitamin D source daily. She had DEXA scan recently. She does not exercise daily but states she has been doing some the the arm exercises that were shown to her previously.No new breast or chest issues. Denies new muscle aches or joint pains, perineal or vaginal irritation. She is fully vaccinated with booster and no current symptoms of concern. Asking if eligible for prosthetic and bras at this time.   Rest of comprehensive and complete ROS is reviewed and is negative.   No past medical history on file.  Current Outpatient Medications   Medication     acetaminophen (TYLENOL) 325 MG tablet     amLODIPine (NORVASC) 5 MG tablet     anastrozole (ARIMIDEX) 1 MG tablet     ARIPiprazole (ABILIFY) 5 MG tablet     calcium carbonate 600 mg-vitamin D 400 units (CALTRATE) 600-400 MG-UNIT per tablet     cholecalciferol 25 MCG (1000 UT) TABS     furosemide (LASIX)  20 MG tablet     mirtazapine (REMERON) 15 MG tablet     Multiple Vitamins-Minerals (OCUVITE-LUTEIN PO)     raloxifene (EVISTA) 60 MG tablet     Specialty Vitamins Products (VITAMINS FOR HAIR) CAPS     venlafaxine (EFFEXOR-XR) 150 MG 24 hr capsule     amoxicillin (AMOXIL) 500 MG capsule     No current facility-administered medications for this visit.      No Known Allergies  Social history:   , recently moved to assisted living in area - from [place]. 3 children Reno, Leonila, Norma. 6 grand kids. No tobacco, rare etoh.    Physical Exam:BP (!) 147/83 (BP Location: Right arm)   Pulse 83   Temp 98.4  F (36.9  C) (Oral)   Resp 16   Ht 1.524 m (5')   Wt 65.8 kg (145 lb)   SpO2 93%   BMI 28.32 kg/m     ECOG PS- 1  Constitutional: Alert and in no distress.   ENT: Eyes bright , No mouth sores  Neck: Supple, No adenopathy.  Cardiac: Heart rate and rhythm is regular and strong without murmur  Respiratory: Breathing easy. Lung sounds clear to auscultation  Breasts:Left chest with no new masses or tenderness. Glue removed from incision from surgery in 10/2021.right breast without masses or discharge, tenderness or new issues.   GI: Abdomen is soft, non-tender, BS normal. No masses or organomegaly  MS: Muscle tone fair- uses walker to get around but able to get up to exam table with light assist, extremities normal with no edema.   Skin: has many age spots- none obviously suspicious.   Neuro: Sensory grossly WNL, gait normal.   Lymph: Normal ant/post cervical, axillary, supraclavicular nodes  Psych: Mentation appears normal and affect normal/bright and smiling.    Laboratory/ Imaging Results:   1/3/2022 DEXA scan:  HISTORY: Estrogen deficiency     COMPARISON:   none     [age]  years.  Height: 60 inches  Weight: 137 pounds  Sex: Female  Ethnicity: White     Image quality: Adequate     Lumbar spine T-score in region of L1 = -1.8      HIPS:  Left femoral neck T-score = -1.8     Radius 33% T-score =  -0.9     FRAX:  10 year probability of major osteoporotic fracture: 15.2%  10 year probability of hip fracture: 4.4%  The 10 year probability of fracture may be lower than reported if the  patient has received treatment. FRAX data should be disregarded in  patient's taking bisphosphonates.     World Health Organization definition of osteoporosis and osteopenia  for  women:   Normal: T-score at or above -1.0  Low Bone Mass (Osteopenia): T-score between -1.0 and -2.5.   Osteoporosis: T-score at or below -2.5   T-scores are reported for postmenopausal women and men over 50 years  of age.                                                                   IMPRESSION:  Left femoral neck bone mineral density is compatible with  osteopenia.     DEDE ESCOBAR MD      Assessment and Plan:   Left Breast Cancer- Pt has completed left mastectomy in 10/2021. She has met with Dr Anguiano for recommendations and has chosen to continue with Arimidex use daily and refused use of Taxol/ Herceptin. She has been on the Arimidex now for 1 month and reports not changes related to drug including no hot flashes or increased muscle aches, headaches and energy level remains good.   She will continue on with the daily Arimidex- given to her by nursing at Assisted living.   Significant amount of glue taken off of surgical site from 2 months previous.   She will return in 3 months to see Dr Anguiano with check on hepatic panel with start of AI. Reviewed follow up schedule for the cancer.   - pt given order for breast prosthesis and bras and has appt for [facility] in near future.   Osteopenia- Dexa scan completed 1/3/2022 proved osteopenia. Made pt aware she is at higher risk for bone loss with use of Arimidex - she is already on Evista for help with bones.Also reports good daily diary intake and daily calcium plus vitamin D supplement.Will repeat DEXA in 2 years.   Moderate recurrent depression- Seeing psychlogist and using abilify,  remeron and effexor. Upbeat today.    The total time of this encounter amounted to 30 minutes. This time included face to face time spent with the patient, prep work, ordering tests, and performing post visit documentation.  Constance Jolley,Cnp

## 2022-01-05 ENCOUNTER — ONCOLOGY VISIT (OUTPATIENT)
Dept: ONCOLOGY | Facility: CLINIC | Age: 82
End: 2022-01-05
Payer: MEDICARE

## 2022-01-05 VITALS
BODY MASS INDEX: 28.47 KG/M2 | HEART RATE: 83 BPM | OXYGEN SATURATION: 93 % | WEIGHT: 145 LBS | HEIGHT: 60 IN | DIASTOLIC BLOOD PRESSURE: 83 MMHG | SYSTOLIC BLOOD PRESSURE: 147 MMHG | TEMPERATURE: 98.4 F | RESPIRATION RATE: 16 BRPM

## 2022-01-05 DIAGNOSIS — E28.39 ESTROGEN DEFICIENCY: ICD-10-CM

## 2022-01-05 DIAGNOSIS — Z79.811 USE OF AROMATASE INHIBITORS: ICD-10-CM

## 2022-01-05 DIAGNOSIS — Z17.0 MALIGNANT NEOPLASM OF UPPER-INNER QUADRANT OF LEFT BREAST IN FEMALE, ESTROGEN RECEPTOR POSITIVE (H): Primary | ICD-10-CM

## 2022-01-05 DIAGNOSIS — C50.212 MALIGNANT NEOPLASM OF UPPER-INNER QUADRANT OF LEFT BREAST IN FEMALE, ESTROGEN RECEPTOR POSITIVE (H): Primary | ICD-10-CM

## 2022-01-05 DIAGNOSIS — M85.80 OSTEOPENIA, UNSPECIFIED LOCATION: ICD-10-CM

## 2022-01-05 PROCEDURE — 99203 OFFICE O/P NEW LOW 30 MIN: CPT | Performed by: NURSE PRACTITIONER

## 2022-01-05 ASSESSMENT — PAIN SCALES - GENERAL: PAINLEVEL: NO PAIN (0)

## 2022-01-05 ASSESSMENT — MIFFLIN-ST. JEOR: SCORE: 1044.22

## 2022-01-05 NOTE — NURSING NOTE
Oncology Rooming Note    January 5, 2022 9:43 AM   Alyssa Aparicio is a 81 year old female who presents for:    Chief Complaint   Patient presents with     Oncology Clinic Visit     Follow up     Initial Vitals: BP (!) 147/83 (BP Location: Right arm)   Pulse 83   Temp 98.4  F (36.9  C) (Oral)   Resp 16   Ht 1.524 m (5')   Wt 65.8 kg (145 lb)   SpO2 93%   BMI 28.32 kg/m   Estimated body mass index is 28.32 kg/m  as calculated from the following:    Height as of this encounter: 1.524 m (5').    Weight as of this encounter: 65.8 kg (145 lb). Body surface area is 1.67 meters squared.  No Pain (0) Comment: Data Unavailable   No LMP recorded. Patient is postmenopausal.  Allergies reviewed: Yes  Medications reviewed: Yes    Medications: Medication refills not needed today.  Pharmacy name entered into [employer]: [facility] - [place] - [address]    Clinical concerns: No new concerns       Anabelle Redd LPN

## 2022-01-05 NOTE — LETTER
1/5/2022         RE: Alyssa Aparicio  [address]  [facility] [address]        Dear Colleague,    Thank you for referring your patient, Alyssa Aparicio, to the [facility]. Please see a copy of my visit note below.    Oncology Follow Up Visit: January 5, 2022    Oncologist: Dr Marjorie Anguiano  PCP: Abstract, Provider    Diagnosis: Left Breast Cancer  Alyssa Aparicio is an 82 yo female breast cancer found with 7/22/21 screening mammogram with Left breast mass in the LIQ  8/19/21 ultrasound with 1.9 x 2.2 1.1 cm mass, bx invasive mammary cancer  ER90%+ [place] 90%+ Her2 grisel 3+ IHC  *Germline genetic testing done in [place], results pending.  Treatment:   10/22/21 Left mastectomy 18 mm IDC G2, margins negative, 0/3 SLN. Ki 67 7%  -Echo in [place] done with EF 70%  12/3/2021 began Arimidex    Interval History: Ms. Aparicio comes to clinic with son for review of start of AI use with Arimidex to treat her breast cancer. Pt has chosen not to proceed with Taxol/Herceptin and treat only with aromatase inhibitor - began Arimidex in 12/2021. Pt has not noted any new symptoms related to the medication- she has nursing service from her assisted living and they provide medication administration so she is not sure what she has been taking but list reports she is on the Arimidex and a calcium plus vitamin D source daily. She had DEXA scan recently. She does not exercise daily but states she has been doing some the the arm exercises that were shown to her previously.No new breast or chest issues. Denies new muscle aches or joint pains, perineal or vaginal irritation. She is fully vaccinated with booster and no current symptoms of concern. Asking if eligible for prosthetic and bras at this time.   Rest of comprehensive and complete ROS is reviewed and is negative.   No past medical history on file.  Current Outpatient Medications   Medication     acetaminophen  (TYLENOL) 325 MG tablet     amLODIPine (NORVASC) 5 MG tablet     anastrozole (ARIMIDEX) 1 MG tablet     ARIPiprazole (ABILIFY) 5 MG tablet     calcium carbonate 600 mg-vitamin D 400 units (CALTRATE) 600-400 MG-UNIT per tablet     cholecalciferol 25 MCG (1000 UT) TABS     furosemide (LASIX) 20 MG tablet     mirtazapine (REMERON) 15 MG tablet     Multiple Vitamins-Minerals (OCUVITE-LUTEIN PO)     raloxifene (EVISTA) 60 MG tablet     Specialty Vitamins Products (VITAMINS FOR HAIR) CAPS     venlafaxine (EFFEXOR-XR) 150 MG 24 hr capsule     amoxicillin (AMOXIL) 500 MG capsule     No current facility-administered medications for this visit.      No Known Allergies  Social history:   , recently moved to assisted living in area - from [place]. 3 children [place], Leonila, Norma. 6 grand kids. No tobacco, rare etoh.    Physical Exam:BP (!) 147/83 (BP Location: Right arm)   Pulse 83   Temp 98.4  F (36.9  C) (Oral)   Resp 16   Ht 1.524 m (5')   Wt 65.8 kg (145 lb)   SpO2 93%   BMI 28.32 kg/m     ECOG PS- 1  Constitutional: Alert and in no distress.   ENT: Eyes bright , No mouth sores  Neck: Supple, No adenopathy.  Cardiac: Heart rate and rhythm is regular and strong without murmur  Respiratory: Breathing easy. Lung sounds clear to auscultation  Breasts:Left chest with no new masses or tenderness. Glue removed from incision from surgery in 10/2021.right breast without masses or discharge, tenderness or new issues.   GI: Abdomen is soft, non-tender, BS normal. No masses or organomegaly  MS: Muscle tone fair- uses walker to get around but able to get up to exam table with light assist, extremities normal with no edema.   Skin: has many age spots- none obviously suspicious.   Neuro: Sensory grossly WNL, gait normal.   Lymph: Normal ant/post cervical, axillary, supraclavicular nodes  Psych: Mentation appears normal and affect normal/bright and smiling.    Laboratory/ Imaging Results:   1/3/2022 DEXA scan:  HISTORY:  Estrogen deficiency     COMPARISON:   none     [age]  years.  Height: 60 inches  Weight: 137 pounds  Sex: Female  Ethnicity: White     Image quality: Adequate     Lumbar spine T-score in region of L1 = -1.8      HIPS:  Left femoral neck T-score = -1.8     Radius 33% T-score = -0.9     FRAX:  10 year probability of major osteoporotic fracture: 15.2%  10 year probability of hip fracture: 4.4%  The 10 year probability of fracture may be lower than reported if the  patient has received treatment. FRAX data should be disregarded in  patient's taking bisphosphonates.     World Health Organization definition of osteoporosis and osteopenia  for  women:   Normal: T-score at or above -1.0  Low Bone Mass (Osteopenia): T-score between -1.0 and -2.5.   Osteoporosis: T-score at or below -2.5   T-scores are reported for postmenopausal women and men over 50 years  of age.                                                                   IMPRESSION:  Left femoral neck bone mineral density is compatible with  osteopenia.     DEDE ESCOBAR MD      Assessment and Plan:   Left Breast Cancer- Pt has completed left mastectomy in 10/2021. She has met with Dr Anguiano for recommendations and has chosen to continue with Arimidex use daily and refused use of Taxol/ Herceptin. She has been on the Arimidex now for 1 month and reports not changes related to drug including no hot flashes or increased muscle aches, headaches and energy level remains good.   She will continue on with the daily Arimidex- given to her by nursing at [facility]isted living.   Significant amount of glue taken off of surgical site from 2 months previous.   She will return in 3 months to see Dr Anguiano with check on hepatic panel with start of AI. Reviewed follow up schedule for the cancer.   - pt given order for breast prosthesis and bras and has appt for [facility] in near future.   Osteopenia- Dexa scan completed 1/3/2022 proved osteopenia. Made pt aware she is at  higher risk for bone loss with use of Arimidex - she is already on Evista for help with bones.Also reports good daily diary intake and daily calcium plus vitamin D supplement.Will repeat DEXA in 2 years.   Moderate recurrent depression- Seeing psychlogist and using abilify, remeron and effexor. Upbeat today.    The total time of this encounter amounted to 30 minutes. This time included face to face time spent with the patient, prep work, ordering tests, and performing post visit documentation.  Constance Jolley Cnp        Again, thank you for allowing me to participate in the care of your patient.        Sincerely,        Constance Jolley, SUSANA, APRN CNP

## 2022-04-05 ENCOUNTER — ONCOLOGY VISIT (OUTPATIENT)
Dept: ONCOLOGY | Facility: CLINIC | Age: 82
End: 2022-04-05
Payer: MEDICARE

## 2022-04-05 ENCOUNTER — LAB (OUTPATIENT)
Dept: LAB | Facility: CLINIC | Age: 82
End: 2022-04-05
Payer: MEDICARE

## 2022-04-05 ENCOUNTER — PATIENT OUTREACH (OUTPATIENT)
Dept: ONCOLOGY | Facility: CLINIC | Age: 82
End: 2022-04-05

## 2022-04-05 VITALS
WEIGHT: 149.8 LBS | OXYGEN SATURATION: 96 % | DIASTOLIC BLOOD PRESSURE: 83 MMHG | HEART RATE: 71 BPM | BODY MASS INDEX: 29.26 KG/M2 | SYSTOLIC BLOOD PRESSURE: 150 MMHG | TEMPERATURE: 97.6 F

## 2022-04-05 DIAGNOSIS — Z17.0 MALIGNANT NEOPLASM OF UPPER-INNER QUADRANT OF LEFT BREAST IN FEMALE, ESTROGEN RECEPTOR POSITIVE (H): ICD-10-CM

## 2022-04-05 DIAGNOSIS — Z79.811 USE OF AROMATASE INHIBITORS: ICD-10-CM

## 2022-04-05 DIAGNOSIS — C50.212 MALIGNANT NEOPLASM OF UPPER-INNER QUADRANT OF LEFT BREAST IN FEMALE, ESTROGEN RECEPTOR POSITIVE (H): ICD-10-CM

## 2022-04-05 DIAGNOSIS — E28.39 ESTROGEN DEFICIENCY: ICD-10-CM

## 2022-04-05 DIAGNOSIS — Z12.31 ENCOUNTER FOR SCREENING MAMMOGRAM FOR MALIGNANT NEOPLASM OF BREAST: ICD-10-CM

## 2022-04-05 DIAGNOSIS — B37.2 YEAST INFECTION OF THE SKIN: Primary | ICD-10-CM

## 2022-04-05 DIAGNOSIS — M85.80 OSTEOPENIA, UNSPECIFIED LOCATION: ICD-10-CM

## 2022-04-05 LAB
ALBUMIN SERPL-MCNC: 3.8 G/DL (ref 3.4–5)
ALP SERPL-CCNC: 82 U/L (ref 40–150)
ALT SERPL W P-5'-P-CCNC: 27 U/L (ref 0–50)
ANION GAP SERPL CALCULATED.3IONS-SCNC: 4 MMOL/L (ref 3–14)
AST SERPL W P-5'-P-CCNC: 24 U/L (ref 0–45)
BILIRUB DIRECT SERPL-MCNC: 0.1 MG/DL (ref 0–0.2)
BILIRUB SERPL-MCNC: 0.4 MG/DL (ref 0.2–1.3)
BUN SERPL-MCNC: 28 MG/DL (ref 7–30)
CALCIUM SERPL-MCNC: 9.3 MG/DL (ref 8.5–10.1)
CHLORIDE BLD-SCNC: 105 MMOL/L (ref 94–109)
CO2 SERPL-SCNC: 33 MMOL/L (ref 20–32)
CREAT SERPL-MCNC: 0.94 MG/DL (ref 0.52–1.04)
GFR SERPL CREATININE-BSD FRML MDRD: 60 ML/MIN/1.73M2
GLUCOSE BLD-MCNC: 91 MG/DL (ref 70–99)
HOLD SPECIMEN: NORMAL
HOLD SPECIMEN: NORMAL
POTASSIUM BLD-SCNC: 4.4 MMOL/L (ref 3.4–5.3)
PROT SERPL-MCNC: 7.4 G/DL (ref 6.8–8.8)
SODIUM SERPL-SCNC: 142 MMOL/L (ref 133–144)

## 2022-04-05 PROCEDURE — 80053 COMPREHEN METABOLIC PANEL: CPT

## 2022-04-05 PROCEDURE — 36415 COLL VENOUS BLD VENIPUNCTURE: CPT

## 2022-04-05 PROCEDURE — 82248 BILIRUBIN DIRECT: CPT

## 2022-04-05 PROCEDURE — 99214 OFFICE O/P EST MOD 30 MIN: CPT | Performed by: INTERNAL MEDICINE

## 2022-04-05 RX ORDER — NYSTATIN 100000 U/G
CREAM TOPICAL 2 TIMES DAILY
Qty: 60 G | Refills: 1 | Status: SHIPPED | OUTPATIENT
Start: 2022-04-05 | End: 2022-04-05

## 2022-04-05 RX ORDER — NYSTATIN 10B UNIT
POWDER (EA) MISCELLANEOUS
Qty: 1 EACH | Refills: 1 | Status: SHIPPED | OUTPATIENT
Start: 2022-04-05

## 2022-04-05 RX ORDER — NYSTATIN 100000 U/G
CREAM TOPICAL 2 TIMES DAILY
Qty: 60 G | Refills: 1 | Status: SHIPPED | OUTPATIENT
Start: 2022-04-05

## 2022-04-05 RX ORDER — NYSTATIN 10B UNIT
POWDER (EA) MISCELLANEOUS
Qty: 1 EACH | Refills: 1 | Status: SHIPPED | OUTPATIENT
Start: 2022-04-05 | End: 2022-04-05

## 2022-04-05 ASSESSMENT — PAIN SCALES - GENERAL: PAINLEVEL: NO PAIN (0)

## 2022-04-05 NOTE — PROGRESS NOTES
[facility]  MEDICAL ONCOLOGY    RETURN PATIENT VISIT NOTE    Reason for visit: breast cancer    Oncology Treatment Summary  1. 7/22/21 screening mammogram with Left breast mass in the LIQ  2. 8/19/21 ultrasound with 1.9 x 2.2 1.1 cm mass, bx invasive mammary cancer  ER90%+ [place] 90%+ Her2 grisel 3+ IHC  3. 10/22/21 Left mastectomy 18 mm IDC G2, margins negative, 0/3 SLN. Ki 67 7%  4. Germline genetic testing done in [place], results pending.  5. Echo in [place] done with EF 70%  6. Started anastrozole 11/2021     HISTORY OF PRESENTING ILLNESS  Pleasant 81 year old presents with her son. She started her anastrozole in late November 2021. She had a right lower extremity DVT in January 2022 and was started on eliquis it is unclear if this was related to not moving much after her surgery nevertheless it has improved with eliquis. She has otherwise been doing okay. She has some discomfort in her chest wall from her mastectomy bra and has had it refitted at least one. There is some redness on the right side.     PAST MEDICAL HISTORY  Depression - major, osteopenia, pectus excavatum, RIGHT THR      CURRENT OUTPATIENT MEDICATIONS  Current Outpatient Medications   Medication     acetaminophen (TYLENOL) 325 MG tablet     amLODIPine (NORVASC) 5 MG tablet     amoxicillin (AMOXIL) 500 MG capsule     anastrozole (ARIMIDEX) 1 MG tablet     ARIPiprazole (ABILIFY) 5 MG tablet     calcium carbonate 600 mg-vitamin D 400 units (CALTRATE) 600-400 MG-UNIT per tablet     cholecalciferol 25 MCG (1000 UT) TABS     furosemide (LASIX) 20 MG tablet     mirtazapine (REMERON) 15 MG tablet     Multiple Vitamins-Minerals (OCUVITE-LUTEIN PO)     raloxifene (EVISTA) 60 MG tablet     Specialty Vitamins Products (VITAMINS FOR HAIR) CAPS     venlafaxine (EFFEXOR-XR) 150 MG 24 hr capsule     No current facility-administered medications for this visit.        ALLERGIES   No Known Allergies     SOCIAL HISTORY  , recently moved  to area. 3 children Leonila Bojorquez, Norma. 6 grand kids. No tobacco rare etoh.     FAMILY HISTORY  See genetic counselor note.     REVIEW OF SYSTEMS  Review Of Systems  Skin: negative  Eyes: negative  Ears/Nose/Throat: negative  Respiratory: No shortness of breath, dyspnea on exertion, cough, or hemoptysis  Cardiovascular: negative  Gastrointestinal: negative  Genitourinary: negative  Musculoskeletal: negative  Neurologic: negative  Psychiatric: negative  Hematologic/Lymphatic/Immunologic: negative  Endocrine: negative      PHYSICAL EXAM  B/P: 151/86, T: Data Unavailable, P: 89, R: 16  Wt Readings from Last 3 Encounters:   04/05/22 67.9 kg (149 lb 12.8 oz)   01/05/22 65.8 kg (145 lb)   12/03/21 62.5 kg (137 lb 12.8 oz)       ECOG PPS1        Physical Exam  Vitals reviewed.   Constitutional:       Appearance: Normal appearance. She is normal weight.   HENT:      Head: Normocephalic and atraumatic.   Eyes:      Extraocular Movements: Extraocular movements intact.      Conjunctiva/sclera: Conjunctivae normal.      Pupils: Pupils are equal, round, and reactive to light.   Pulmonary:      Effort: Pulmonary effort is normal.   Skin:     General: Skin is warm.   Neurological:      General: No focal deficit present.      Mental Status: She is alert and oriented to person, place, and time. Mental status is at baseline.   Psychiatric:         Behavior: Behavior normal.         Thought Content: Thought content normal.         Judgment: Judgment normal.      breast: pectus excavatum, left breast surgically absent, no local region disease. Right breast no masses in the Right lower inner fold of the breast she has some erythema that appears to be a yeast infection.         LABORATORY AND IMAGING STUDIES  Recent Labs   Lab Test 04/05/22  0959 11/19/21  1230    140   POTASSIUM 4.4 4.5   CHLORIDE 105 104   CO2 33* 33*   ANIONGAP 4 3   BUN 28 34*   CR 0.94 0.90   GLC 91 92   DANELLE 9.3 9.1     No results for input(s): MAG, PHOS in  the last 27153 hours.  Recent Labs   Lab Test 11/19/21  1230   WBC 8.7   HGB 12.4      MCV 94   NEUTROPHIL 64     Recent Labs   Lab Test 04/05/22  0959 11/19/21  1230   BILITOTAL 0.4 0.3   ALKPHOS 82 75   ALT 27 23   AST 24 27   ALBUMIN 3.8 3.5     No results found for: TSH  No results for input(s): CEA in the last 95853 hours.  Results for orders placed or performed in visit on 01/03/22   DX Wrist Heel Radius    Narrative    HISTORY: Estrogen deficiency    COMPARISON:   none    [age]  years[age]  Height: 60 inches  Weight: 137 pounds  Sex: Female  Ethnicity: White    Image quality: Adequate    Lumbar spine T-score in region of L1 = -1.8     HIPS:  Left femoral neck T-score = -1.8    Radius 33% T-score = -0.9    FRAX:  10 year probability of major osteoporotic fracture: 15.2%  10 year probability of hip fracture: 4.4%  The 10 year probability of fracture may be lower than reported if the  patient has received treatment. FRAX data should be disregarded in  patient's taking bisphosphonates.    World Health Organization definition of osteoporosis and osteopenia  for  women:   Normal: T-score at or above -1.0  Low Bone Mass (Osteopenia): T-score between -1.0 and -2.5.   Osteoporosis: T-score at or below -2.5   T-scores are reported for postmenopausal women and men over 50 years  of age.      Impression    IMPRESSION:  Left femoral neck bone mineral density is compatible with  osteopenia.    DEDE ESCOBAR MD         SYSTEM ID:  [id number]        ASSESSMENT  AND RECOMMENDATIONS:    IMP    1. [id number] ER+OH+[id number] breast cancer of the left breast s/p mastectomy  2. Major Depression  3. Local breast skin fold yeast infection    Plan    1. Mammogram in July  2. Continue anastrozole  3. Nystatin cream and powder to affected area bid prn - rx sent  4. See me in 4-6 months.    Marjorie Anguiano MD on 4/5/2022 at 11:03 AM

## 2022-04-05 NOTE — LETTER
4/5/2022         RE: Alyssa Aparicio  [address]  [facility] [address]  [place] [address]        Dear Colleague,    Thank you for referring your patient, Alyssa Aparicio, to the [facility]. Please see a copy of my visit note below.    [facility]  MEDICAL ONCOLOGY    RETURN PATIENT VISIT NOTE    Reason for visit: breast cancer    Oncology Treatment Summary  1. 7/22/21 screening mammogram with Left breast mass in the LIQ  2. 8/19/21 ultrasound with 1.9 x 2.2 1.1 cm mass, bx invasive mammary cancer  ER90%+ [place] 90%+ Her2 grisel 3+ IHC  3. 10/22/21 Left mastectomy 18 mm IDC G2, margins negative, 0/3 SLN. Ki 67 7%  4. Germline genetic testing done in [place], results pending.  5. Echo in [place] done with EF 70%  6. Started anastrozole 11/2021     HISTORY OF PRESENTING ILLNESS  Pleasant 81 year old presents with her son. She started her anastrozole in late November 2021. She had a right lower extremity DVT in January 2022 and was started on eliquis it is unclear if this was related to not moving much after her surgery nevertheless it has improved with eliquis. She has otherwise been doing okay. She has some discomfort in her chest wall from her mastectomy bra and has had it refitted at least one. There is some redness on the right side.     PAST MEDICAL HISTORY  Depression - major, osteopenia, pectus excavatum, RIGHT THR      CURRENT OUTPATIENT MEDICATIONS  Current Outpatient Medications   Medication     acetaminophen (TYLENOL) 325 MG tablet     amLODIPine (NORVASC) 5 MG tablet     amoxicillin (AMOXIL) 500 MG capsule     anastrozole (ARIMIDEX) 1 MG tablet     ARIPiprazole (ABILIFY) 5 MG tablet     calcium carbonate 600 mg-vitamin D 400 units (CALTRATE) 600-400 MG-UNIT per tablet     cholecalciferol 25 MCG (1000 UT) TABS     furosemide (LASIX) 20 MG tablet     mirtazapine (REMERON) 15 MG tablet     Multiple Vitamins-Minerals  (OCUVITE-LUTEIN PO)     raloxifene (EVISTA) 60 MG tablet     Specialty Vitamins Products (VITAMINS FOR HAIR) CAPS     venlafaxine (EFFEXOR-XR) 150 MG 24 hr capsule     No current facility-administered medications for this visit.        ALLERGIES   No Known Allergies     SOCIAL HISTORY  , recently moved to area. 3 children Leonila Bojorquez, Norma. 6 grand kids. No tobacco rare etoh.     FAMILY HISTORY  See genetic counselor note.     REVIEW OF SYSTEMS  Review Of Systems  Skin: negative  Eyes: negative  Ears/Nose/Throat: negative  Respiratory: No shortness of breath, dyspnea on exertion, cough, or hemoptysis  Cardiovascular: negative  Gastrointestinal: negative  Genitourinary: negative  Musculoskeletal: negative  Neurologic: negative  Psychiatric: negative  Hematologic/Lymphatic/Immunologic: negative  Endocrine: negative      PHYSICAL EXAM  B/P: 151/86, T: Data Unavailable, P: 89, R: 16  Wt Readings from Last 3 Encounters:   04/05/22 67.9 kg (149 lb 12.8 oz)   01/05/22 65.8 kg (145 lb)   12/03/21 62.5 kg (137 lb 12.8 oz)       ECOG PPS1        Physical Exam  Vitals reviewed.   Constitutional:       Appearance: Normal appearance. She is normal weight.   HENT:      Head: Normocephalic and atraumatic.   Eyes:      Extraocular Movements: Extraocular movements intact.      Conjunctiva/sclera: Conjunctivae normal.      Pupils: Pupils are equal, round, and reactive to light.   Pulmonary:      Effort: Pulmonary effort is normal.   Skin:     General: Skin is warm.   Neurological:      General: No focal deficit present.      Mental Status: She is alert and oriented to person, place, and time. Mental status is at baseline.   Psychiatric:         Behavior: Behavior normal.         Thought Content: Thought content normal.         Judgment: Judgment normal.      breast: pectus excavatum, left breast surgically absent, no local region disease. Right breast no masses in the Right lower inner fold of the breast she has some erythema  that appears to be a yeast infection.         LABORATORY AND IMAGING STUDIES  Recent Labs   Lab Test 04/05/22  0959 11/19/21  1230    140   POTASSIUM 4.4 4.5   CHLORIDE 105 104   CO2 33* 33*   ANIONGAP 4 3   BUN 28 34*   CR 0.94 0.90   GLC 91 92   DANELLE 9.3 9.1     No results for input(s): MAG, PHOS in the last 37032 hours.  Recent Labs   Lab Test 11/19/21  1230   WBC 8.7   HGB 12.4      MCV 94   NEUTROPHIL 64     Recent Labs   Lab Test 04/05/22  0959 11/19/21  1230   BILITOTAL 0.4 0.3   ALKPHOS 82 75   ALT 27 23   AST 24 27   ALBUMIN 3.8 3.5     No results found for: TSH  No results for input(s): CEA in the last 83850 hours.  Results for orders placed or performed in visit on 01/03/22   DX Wrist Heel Radius    Narrative    HISTORY: Estrogen deficiency    COMPARISON:   none    [age]  years[age]  Height: 60 inches  Weight: 137 pounds  Sex: Female  Ethnicity: White    Image quality: Adequate    Lumbar spine T-score in region of L1 = -1.8     HIPS:  Left femoral neck T-score = -1.8    Radius 33% T-score = -0.9    FRAX:  10 year probability of major osteoporotic fracture: 15.2%  10 year probability of hip fracture: 4.4%  The 10 year probability of fracture may be lower than reported if the  patient has received treatment. FRAX data should be disregarded in  patient's taking bisphosphonates.    World Health Organization definition of osteoporosis and osteopenia  for  women:   Normal: T-score at or above -1.0  Low Bone Mass (Osteopenia): T-score between -1.0 and -2.5.   Osteoporosis: T-score at or below -2.5   T-scores are reported for postmenopausal women and men over 50 years  of age.      Impression    IMPRESSION:  Left femoral neck bone mineral density is compatible with  osteopenia.    DEDE ESCOBAR MD         SYSTEM ID:  [id number]        ASSESSMENT  AND RECOMMENDATIONS:    IMP    1. [id number] ER+AR+[id number] breast cancer of the left breast s/p mastectomy  2. Major Depression  3. Local  breast skin fold yeast infection    Plan    1. Mammogram in July  2. Continue anastrozole  3. Nystatin cream and powder to affected area bid prn - rx sent  4. See me in 4-6 months.    Marjorie Anguiano MD on 4/5/2022 at 11:03 AM                Again, thank you for allowing me to participate in the care of your patient.        Sincerely,        Marjorie Anguiano MD

## 2022-04-07 NOTE — PROGRESS NOTES
Orders for Nystatin cream and powder faxed to [facility] at [phone].

## 2022-07-13 ENCOUNTER — LAB REQUISITION (OUTPATIENT)
Dept: LAB | Facility: CLINIC | Age: 82
End: 2022-07-13
Payer: MEDICARE

## 2022-07-13 DIAGNOSIS — R41.89 OTHER SYMPTOMS AND SIGNS INVOLVING COGNITIVE FUNCTIONS AND AWARENESS: ICD-10-CM

## 2022-07-13 DIAGNOSIS — I15.8 OTHER SECONDARY HYPERTENSION: ICD-10-CM

## 2022-07-13 DIAGNOSIS — M81.0 AGE-RELATED OSTEOPOROSIS WITHOUT CURRENT PATHOLOGICAL FRACTURE: ICD-10-CM

## 2022-07-15 LAB
ALBUMIN SERPL BCG-MCNC: 4.1 G/DL (ref 3.5–5.2)
ALP SERPL-CCNC: 92 U/L (ref 35–104)
ALT SERPL W P-5'-P-CCNC: 19 U/L (ref 10–35)
ANION GAP SERPL CALCULATED.3IONS-SCNC: 9 MMOL/L (ref 7–15)
AST SERPL W P-5'-P-CCNC: 32 U/L (ref 10–35)
BILIRUB SERPL-MCNC: 0.3 MG/DL
BUN SERPL-MCNC: 26.6 MG/DL (ref 8–23)
CALCIUM SERPL-MCNC: 9.4 MG/DL (ref 8.8–10.2)
CHLORIDE SERPL-SCNC: 101 MMOL/L (ref 98–107)
CREAT SERPL-MCNC: 1.07 MG/DL (ref 0.51–0.95)
DEPRECATED CALCIDIOL+CALCIFEROL SERPL-MC: 54 UG/L (ref 20–75)
DEPRECATED HCO3 PLAS-SCNC: 29 MMOL/L (ref 22–29)
ERYTHROCYTE [DISTWIDTH] IN BLOOD BY AUTOMATED COUNT: 13 % (ref 10–15)
GFR SERPL CREATININE-BSD FRML MDRD: 52 ML/MIN/1.73M2
GLUCOSE SERPL-MCNC: 89 MG/DL (ref 70–99)
HCT VFR BLD AUTO: 44.5 % (ref 35–47)
HGB BLD-MCNC: 14.3 G/DL (ref 11.7–15.7)
MCH RBC QN AUTO: 29.7 PG (ref 26.5–33)
MCHC RBC AUTO-ENTMCNC: 32.1 G/DL (ref 31.5–36.5)
MCV RBC AUTO: 92 FL (ref 78–100)
PLATELET # BLD AUTO: 183 10E3/UL (ref 150–450)
POTASSIUM SERPL-SCNC: 4.5 MMOL/L (ref 3.4–5.3)
PROT SERPL-MCNC: 6.8 G/DL (ref 6.4–8.3)
RBC # BLD AUTO: 4.82 10E6/UL (ref 3.8–5.2)
SODIUM SERPL-SCNC: 139 MMOL/L (ref 136–145)
TSH SERPL DL<=0.005 MIU/L-ACNC: 2.93 UIU/ML (ref 0.3–4.2)
VIT B12 SERPL-MCNC: 932 PG/ML (ref 232–1245)
WBC # BLD AUTO: 6 10E3/UL (ref 4–11)

## 2022-07-15 PROCEDURE — 85027 COMPLETE CBC AUTOMATED: CPT | Mod: ORL | Performed by: PHYSICIAN ASSISTANT

## 2022-07-15 PROCEDURE — 82306 VITAMIN D 25 HYDROXY: CPT | Mod: ORL | Performed by: PHYSICIAN ASSISTANT

## 2022-07-15 PROCEDURE — 84443 ASSAY THYROID STIM HORMONE: CPT | Mod: ORL | Performed by: PHYSICIAN ASSISTANT

## 2022-07-15 PROCEDURE — 82607 VITAMIN B-12: CPT | Mod: ORL | Performed by: PHYSICIAN ASSISTANT

## 2022-07-15 PROCEDURE — 80053 COMPREHEN METABOLIC PANEL: CPT | Mod: ORL | Performed by: PHYSICIAN ASSISTANT

## 2022-07-15 PROCEDURE — P9603 ONE-WAY ALLOW PRORATED MILES: HCPCS | Mod: ORL | Performed by: PHYSICIAN ASSISTANT

## 2022-07-15 PROCEDURE — 36415 COLL VENOUS BLD VENIPUNCTURE: CPT | Mod: ORL | Performed by: PHYSICIAN ASSISTANT

## 2022-07-25 ENCOUNTER — ANCILLARY PROCEDURE (OUTPATIENT)
Dept: MAMMOGRAPHY | Facility: CLINIC | Age: 82
End: 2022-07-25
Attending: INTERNAL MEDICINE
Payer: MEDICARE

## 2022-07-25 DIAGNOSIS — Z12.31 ENCOUNTER FOR SCREENING MAMMOGRAM FOR MALIGNANT NEOPLASM OF BREAST: ICD-10-CM

## 2022-07-25 DIAGNOSIS — C50.212 MALIGNANT NEOPLASM OF UPPER-INNER QUADRANT OF LEFT BREAST IN FEMALE, ESTROGEN RECEPTOR POSITIVE (H): ICD-10-CM

## 2022-07-25 DIAGNOSIS — Z17.0 MALIGNANT NEOPLASM OF UPPER-INNER QUADRANT OF LEFT BREAST IN FEMALE, ESTROGEN RECEPTOR POSITIVE (H): ICD-10-CM

## 2022-07-25 PROCEDURE — 77067 SCR MAMMO BI INCL CAD: CPT | Mod: 52

## 2022-08-23 ENCOUNTER — ONCOLOGY VISIT (OUTPATIENT)
Dept: ONCOLOGY | Facility: CLINIC | Age: 82
End: 2022-08-23
Attending: INTERNAL MEDICINE
Payer: MEDICARE

## 2022-08-23 VITALS
DIASTOLIC BLOOD PRESSURE: 78 MMHG | WEIGHT: 150.9 LBS | TEMPERATURE: 98.1 F | HEART RATE: 81 BPM | SYSTOLIC BLOOD PRESSURE: 152 MMHG | HEIGHT: 60 IN | BODY MASS INDEX: 29.63 KG/M2 | OXYGEN SATURATION: 93 %

## 2022-08-23 DIAGNOSIS — C50.212 MALIGNANT NEOPLASM OF UPPER-INNER QUADRANT OF LEFT BREAST IN FEMALE, ESTROGEN RECEPTOR POSITIVE (H): Primary | ICD-10-CM

## 2022-08-23 DIAGNOSIS — Z17.0 MALIGNANT NEOPLASM OF UPPER-INNER QUADRANT OF LEFT BREAST IN FEMALE, ESTROGEN RECEPTOR POSITIVE (H): Primary | ICD-10-CM

## 2022-08-23 PROCEDURE — 99213 OFFICE O/P EST LOW 20 MIN: CPT | Performed by: INTERNAL MEDICINE

## 2022-08-23 ASSESSMENT — PAIN SCALES - GENERAL: PAINLEVEL: NO PAIN (0)

## 2022-08-23 NOTE — NURSING NOTE
Oncology Rooming Note    August 23, 2022 9:35 AM   Alyssa Aparicio is a 82 year old female who presents for:    Chief Complaint   Patient presents with     Oncology Clinic Visit     Initial Vitals: BP (!) 152/78 (BP Location: Left arm, Patient Position: Sitting)   Pulse 81   Temp 98.1  F (36.7  C) (Oral)   Ht 1.524 m (5')   Wt 68.4 kg (150 lb 14.4 oz)   SpO2 93%   BMI 29.47 kg/m   Estimated body mass index is 29.47 kg/m  as calculated from the following:    Height as of this encounter: 1.524 m (5').    Weight as of this encounter: 68.4 kg (150 lb 14.4 oz). Body surface area is 1.7 meters squared.  No Pain (0) Comment: Data Unavailable   No LMP recorded. Patient is postmenopausal.  Allergies reviewed: Yes  Medications reviewed: Yes    Medications: Medication refills not needed today.  Pharmacy name entered into [employer]: [facility] - [facility] [place] - [id number]    Clinical concerns: denies any concerns today.       Crystal Metcalf LPN

## 2022-08-23 NOTE — PROGRESS NOTES
[facility]  MEDICAL ONCOLOGY    RETURN PATIENT VISIT NOTE    Reason for visit: breast cancer    Oncology Treatment Summary  1. 7/22/21 screening mammogram with Left breast mass in the LIQ  2. 8/19/21 ultrasound with 1.9 x 2.2 1.1 cm mass, bx invasive mammary cancer  ER90%+ [place] 90%+ Her2 grisel 3+ IHC  3. 10/22/21 Left mastectomy 18 mm IDC G2, margins negative, 0/3 SLN. Ki 67 7%  4. Germline genetic testing done in [place], results pending.  5. Echo in [place] done with EF 70%  6. Started anastrozole 11/2021     HISTORY OF PRESENTING ILLNESS  Pleasant 81 year old presents with her son. She started her anastrozole in late November 2021. She had a right lower extremity DVT in January 2022 and was started on eliquis.    She is here for routine followup. She is doing well. No new issues or symptoms. No n/v/d/c. No new aches or pains. She is tolerating her arimidex well.      PAST MEDICAL HISTORY  Depression - major, osteopenia, pectus excavatum, RIGHT THR      CURRENT OUTPATIENT MEDICATIONS  Current Outpatient Medications   Medication     acetaminophen (TYLENOL) 325 MG tablet     amLODIPine (NORVASC) 5 MG tablet     amoxicillin (AMOXIL) 500 MG capsule     anastrozole (ARIMIDEX) 1 MG tablet     apixaban ANTICOAGULANT (ELIQUIS ANTICOAGULANT) 5 MG tablet     ARIPiprazole (ABILIFY) 5 MG tablet     calcium carbonate 600 mg-vitamin D 400 units (CALTRATE) 600-400 MG-UNIT per tablet     cholecalciferol 25 MCG (1000 UT) TABS     furosemide (LASIX) 20 MG tablet     mirtazapine (REMERON) 15 MG tablet     Multiple Vitamins-Minerals (OCUVITE-LUTEIN PO)     nystatin (MYCOSTATIN) 199495 UNIT/GM external cream     nystatin POWD     raloxifene (EVISTA) 60 MG tablet     Specialty Vitamins Products (VITAMINS FOR HAIR) CAPS     venlafaxine (EFFEXOR-XR) 150 MG 24 hr capsule     No current facility-administered medications for this visit.        ALLERGIES   No Known Allergies     SOCIAL HISTORY  ,  3 children  [place], Leonila, Norma. 6 grand kids. No tobacco rare etoh.     FAMILY HISTORY  See genetic counselor note.     REVIEW OF SYSTEMS  Review Of Systems  Skin: negative  Eyes: negative  Ears/Nose/Throat: negative  Respiratory: No shortness of breath, dyspnea on exertion, cough, or hemoptysis  Cardiovascular: negative  Gastrointestinal: negative  Genitourinary: negative  Musculoskeletal: negative  Neurologic: negative  Psychiatric: negative  Hematologic/Lymphatic/Immunologic: negative  Endocrine: negative      PHYSICAL EXAM  B/P: 151/86, T: Data Unavailable, P: 89, R: 16  Wt Readings from Last 3 Encounters:   04/05/22 67.9 kg (149 lb 12.8 oz)   01/05/22 65.8 kg (145 lb)   12/03/21 62.5 kg (137 lb 12.8 oz)       ECOG PPS1        Physical Exam  Vitals reviewed.   Constitutional:       Appearance: Normal appearance. She is normal weight.   HENT:      Head: Normocephalic and atraumatic.   Eyes:      Extraocular Movements: Extraocular movements intact.      Conjunctiva/sclera: Conjunctivae normal.      Pupils: Pupils are equal, round, and reactive to light.   Pulmonary:      Effort: Pulmonary effort is normal.   Skin:     General: Skin is warm.   Neurological:      General: No focal deficit present.      Mental Status: She is alert and oriented to person, place, and time. Mental status is at baseline.   Psychiatric:         Behavior: Behavior normal.         Thought Content: Thought content normal.         Judgment: Judgment normal.      breast: pectus excavatum, left breast surgically absent, no local region disease. Right breast no masses , no nipple discharge axilla benign.     LABORATORY AND IMAGING STUDIES  Recent Labs   Lab Test 07/15/22  0714 04/05/22  0959 11/19/21  1230    142 140   POTASSIUM 4.5 4.4 4.5   CHLORIDE 101 105 104   CO2 29 33* 33*   ANIONGAP 9 4 3   BUN 26.6* 28 34*   CR 1.07* 0.94 0.90   GLC 89 91 92   DANELLE 9.4 9.3 9.1     No results for input(s): MAG, PHOS in the last 03603 hours.  Recent Labs   Lab  Test 07/15/22  0714 11/19/21  1230   WBC 6.0 8.7   HGB 14.3 12.4    161   MCV 92 94   NEUTROPHIL  --  64     Recent Labs   Lab Test 07/15/22  0714 04/05/22  0959 11/19/21  1230   BILITOTAL 0.3 0.4 0.3   ALKPHOS 92 82 75   ALT 19 27 23   AST 32 24 27   ALBUMIN 4.1 3.8 3.5     TSH   Date Value Ref Range Status   07/15/2022 2.93 0.30 - 4.20 uIU/mL Final     No results for input(s): CEA in the last 91984 hours.  Results for orders placed or performed in visit on 07/25/22   MA Screening Digital Right    Narrative    RIGHT FULL FIELD DIGITAL SCREENING MAMMOGRAM    Performed on: 7/25/22    Compared to: 09/01/2021 and 07/22/2021    Technique: This study was evaluated with the assistance of Computer-Aided   Detection.    Findings: The patient is status post left mastectomy. The breast is almost   entirely fatty. There are benign appearing calcifications in the right   breast.  There is no radiographic evidence of malignancy in the right breast.     IMPRESSION: ACR BI-RADS Category 2: Negative    RECOMMENDED FOLLOW-UP: Annual routine screening mammogram    The results and recommendations of this examination will be communicated   to the patient.    I have personally reviewed the examination and initial interpretation  and I agree with the findings.          ASSESSMENT  AND RECOMMENDATIONS:    IMP    1. [id number] ER+KY+[id number] breast cancer of the left breast s/p mastectomy  2. Major Depression    Plan    1. Mammogram was unremarkable. Repeat July 2023  2. Next dexa due January 2024  3. Continue arimidex  4. rtc 4 months.    Marjorie Anguiano MD on 8/23/2022 at 9:50 AM

## 2022-08-23 NOTE — LETTER
8/23/2022         RE: Alyssa Aparicio  [address]  [facility] [address]  [facility] [address]        Dear Colleague,    Thank you for referring your patient, Alyssa Aparicio, to the [facility]. Please see a copy of my visit note below.    [facility]YS[facility]  MEDICAL ONCOLOGY    RETURN PATIENT VISIT NOTE    Reason for visit: breast cancer    Oncology Treatment Summary  1. 7/22/21 screening mammogram with Left breast mass in the LIQ  2. 8/19/21 ultrasound with 1.9 x 2.2 1.1 cm mass, bx invasive mammary cancer  ER90%+ MS 90%+ Her2 grisel 3+ IHC  3. 10/22/21 Left mastectomy 18 mm IDC G2, margins negative, 0/3 SLN. Ki 67 7%  4. Germline genetic testing done in [place], results pending.  5. Echo in [place] done with EF 70%  6. Started anastrozole 11/2021     HISTORY OF PRESENTING ILLNESS  Pleasant 81 year old presents with her son. She started her anastrozole in late November 2021. She had a right lower extremity DVT in January 2022 and was started on eliquis.    She is here for routine followup. She is doing well. No new issues or symptoms. No n/v/d/c. No new aches or pains. She is tolerating her arimidex well.      PAST MEDICAL HISTORY  Depression - major, osteopenia, pectus excavatum, RIGHT THR      CURRENT OUTPATIENT MEDICATIONS  Current Outpatient Medications   Medication     acetaminophen (TYLENOL) 325 MG tablet     amLODIPine (NORVASC) 5 MG tablet     amoxicillin (AMOXIL) 500 MG capsule     anastrozole (ARIMIDEX) 1 MG tablet     apixaban ANTICOAGULANT (ELIQUIS ANTICOAGULANT) 5 MG tablet     ARIPiprazole (ABILIFY) 5 MG tablet     calcium carbonate 600 mg-vitamin D 400 units (CALTRATE) 600-400 MG-UNIT per tablet     cholecalciferol 25 MCG (1000 UT) TABS     furosemide (LASIX) 20 MG tablet     mirtazapine (REMERON) 15 MG tablet     Multiple Vitamins-Minerals (OCUVITE-LUTEIN PO)     nystatin (MYCOSTATIN) 107707 UNIT/GM external cream      nystatin POWD     raloxifene (EVISTA) 60 MG tablet     Specialty Vitamins Products (VITAMINS FOR HAIR) CAPS     venlafaxine (EFFEXOR-XR) 150 MG 24 hr capsule     No current facility-administered medications for this visit.        ALLERGIES   No Known Allergies     SOCIAL HISTORY  ,  3 children Reno, Leonila, Norma. 6 grand kids. No tobacco rare etoh.     FAMILY HISTORY  See genetic counselor note.     REVIEW OF SYSTEMS  Review Of Systems  Skin: negative  Eyes: negative  Ears/Nose/Throat: negative  Respiratory: No shortness of breath, dyspnea on exertion, cough, or hemoptysis  Cardiovascular: negative  Gastrointestinal: negative  Genitourinary: negative  Musculoskeletal: negative  Neurologic: negative  Psychiatric: negative  Hematologic/Lymphatic/Immunologic: negative  Endocrine: negative      PHYSICAL EXAM  B/P: 151/86, T: Data Unavailable, P: 89, R: 16  Wt Readings from Last 3 Encounters:   04/05/22 67.9 kg (149 lb 12.8 oz)   01/05/22 65.8 kg (145 lb)   12/03/21 62.5 kg (137 lb 12.8 oz)       ECOG PPS1        Physical Exam  Vitals reviewed.   Constitutional:       Appearance: Normal appearance. She is normal weight.   HENT:      Head: Normocephalic and atraumatic.   Eyes:      Extraocular Movements: Extraocular movements intact.      Conjunctiva/sclera: Conjunctivae normal.      Pupils: Pupils are equal, round, and reactive to light.   Pulmonary:      Effort: Pulmonary effort is normal.   Skin:     General: Skin is warm.   Neurological:      General: No focal deficit present.      Mental Status: She is alert and oriented to person, place, and time. Mental status is at baseline.   Psychiatric:         Behavior: Behavior normal.         Thought Content: Thought content normal.         Judgment: Judgment normal.      breast: pectus excavatum, left breast surgically absent, no local region disease. Right breast no masses , no nipple discharge axilla benign.     LABORATORY AND IMAGING STUDIES  Recent Labs   Lab Test  07/15/22  0714 04/05/22  0959 11/19/21  1230    142 140   POTASSIUM 4.5 4.4 4.5   CHLORIDE 101 105 104   CO2 29 33* 33*   ANIONGAP 9 4 3   BUN 26.6* 28 34*   CR 1.07* 0.94 0.90   GLC 89 91 92   DANELLE 9.4 9.3 9.1     No results for input(s): MAG, PHOS in the last 97767 hours.  Recent Labs   Lab Test 07/15/22  0714 11/19/21  1230   WBC 6.0 8.7   HGB 14.3 12.4    161   MCV 92 94   NEUTROPHIL  --  64     Recent Labs   Lab Test 07/15/22  0714 04/05/22  0959 11/19/21  1230   BILITOTAL 0.3 0.4 0.3   ALKPHOS 92 82 75   ALT 19 27 23   AST 32 24 27   ALBUMIN 4.1 3.8 3.5     TSH   Date Value Ref Range Status   07/15/2022 2.93 0.30 - 4.20 uIU/mL Final     No results for input(s): CEA in the last 99538 hours.  Results for orders placed or performed in visit on 07/25/22   MA Screening Digital Right    Narrative    RIGHT FULL FIELD DIGITAL SCREENING MAMMOGRAM    Performed on: 7/25/22    Compared to: 09/01/2021 and 07/22/2021    Technique: This study was evaluated with the assistance of Computer-Aided   Detection.    Findings: The patient is status post left mastectomy. The breast is almost   entirely fatty. There are benign appearing calcifications in the right   breast.  There is no radiographic evidence of malignancy in the right breast.     IMPRESSION: ACR BI-RADS Category 2: Negative    RECOMMENDED FOLLOW-UP: Annual routine screening mammogram    The results and recommendations of this examination will be communicated   to the patient.    I have personally reviewed the examination and initial interpretation  and I agree with the findings.          ASSESSMENT  AND RECOMMENDATIONS:    IMP    1. [id number] ER+SC+[id number] breast cancer of the left breast s/p mastectomy  2. Major Depression    Plan    1. Mammogram was unremarkable. Repeat July 2023  2. Next dexa due January 2024  3. Continue arimidex  4. rtc 4 months.    Marjorie Anguiano MD on 8/23/2022 at 9:50 AM                Again, thank you for  allowing me to participate in the care of your patient.        Sincerely,        Marjorie Anguiano MD

## 2022-09-24 ENCOUNTER — HEALTH MAINTENANCE LETTER (OUTPATIENT)
Age: 82
End: 2022-09-24

## 2022-12-20 ENCOUNTER — ONCOLOGY VISIT (OUTPATIENT)
Dept: ONCOLOGY | Facility: CLINIC | Age: 82
End: 2022-12-20
Attending: INTERNAL MEDICINE
Payer: MEDICARE

## 2022-12-20 VITALS
HEART RATE: 82 BPM | BODY MASS INDEX: 30.27 KG/M2 | DIASTOLIC BLOOD PRESSURE: 77 MMHG | OXYGEN SATURATION: 91 % | WEIGHT: 155 LBS | SYSTOLIC BLOOD PRESSURE: 127 MMHG | TEMPERATURE: 97.6 F

## 2022-12-20 DIAGNOSIS — C50.212 MALIGNANT NEOPLASM OF UPPER-INNER QUADRANT OF LEFT BREAST IN FEMALE, ESTROGEN RECEPTOR POSITIVE (H): ICD-10-CM

## 2022-12-20 DIAGNOSIS — Z17.0 MALIGNANT NEOPLASM OF UPPER-INNER QUADRANT OF LEFT BREAST IN FEMALE, ESTROGEN RECEPTOR POSITIVE (H): ICD-10-CM

## 2022-12-20 PROCEDURE — 99214 OFFICE O/P EST MOD 30 MIN: CPT | Performed by: INTERNAL MEDICINE

## 2022-12-20 RX ORDER — ANASTROZOLE 1 MG/1
1 TABLET ORAL DAILY
Qty: 90 TABLET | Refills: 3 | Status: SHIPPED | OUTPATIENT
Start: 2022-12-20 | End: 2024-01-11

## 2022-12-20 RX ORDER — LOSARTAN POTASSIUM 50 MG/1
TABLET ORAL
COMMUNITY
Start: 2022-11-15

## 2022-12-20 ASSESSMENT — PAIN SCALES - GENERAL: PAINLEVEL: NO PAIN (0)

## 2022-12-20 NOTE — Clinical Note
12/20/2022         RE: Alyssa Aparicio  [address]  [facility] [address]  [facility] [address]        Dear Colleague,    Thank you for referring your patient, Alyssa Aparicio, to the [facility]. Please see a copy of my visit note below.    [facility] PHYSICIANS  MEDICAL ONCOLOGY    RETURN PATIENT VISIT NOTE    Reason for visit: breast cancer    Oncology Treatment Summary  1. 7/22/21 screening mammogram with Left breast mass in the LIQ  2. 8/19/21 ultrasound with 1.9 x 2.2 1.1 cm mass, bx invasive mammary cancer  ER90%+ DC 90%+ Her2 grisel 3+ IHC  3. 10/22/21 Left mastectomy 18 mm IDC G2, margins negative, 0/3 SLN. Ki 67 7%  4. Germline genetic testing done in [place], results pending.  5. Echo in [place] done with EF 70%  6. Started anastrozole 11/2021     HISTORY OF PRESENTING ILLNESS  Pleasant 82 year old presents with her son. She continues on her anastrozole.    She is having some issues with carpal tunnel on her right wrist. 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 She functions fine. She is thinking about getting a brace for it.  She has not otherwise had new issues.     PAST MEDICAL HISTORY  Depression - major, osteopenia, pectus excavatum, RIGHT THR      CURRENT OUTPATIENT MEDICATIONS  Current Outpatient Medications   Medication     acetaminophen (TYLENOL) 325 MG tablet     amLODIPine (NORVASC) 5 MG tablet     amoxicillin (AMOXIL) 500 MG capsule     anastrozole (ARIMIDEX) 1 MG tablet     apixaban ANTICOAGULANT (ELIQUIS ANTICOAGULANT) 5 MG tablet     ARIPiprazole (ABILIFY) 5 MG tablet     calcium carbonate 600 mg-vitamin D 400 units (CALTRATE) 600-400 MG-UNIT per tablet     cholecalciferol 25 MCG (1000 UT) TABS     furosemide (LASIX) 20 MG tablet     mirtazapine (REMERON) 15 MG tablet     Multiple Vitamins-Minerals (OCUVITE-LUTEIN PO)     nystatin (MYCOSTATIN) 770850 UNIT/GM external cream     nystatin  POWD     raloxifene (EVISTA) 60 MG tablet     Specialty Vitamins Products (VITAMINS FOR HAIR) CAPS     venlafaxine (EFFEXOR-XR) 150 MG 24 hr capsule     No current facility-administered medications for this visit.        ALLERGIES   No Known Allergies     SOCIAL HISTORY  ,  3 children Reno, Leonila, Norma. 6 grand kids. No tobacco rare etoh. Son is with her today.     FAMILY HISTORY  See genetic counselor note.     REVIEW OF SYSTEMS  Review Of Systems  Skin: negative  Eyes: negative  Ears/Nose/Throat: negative  Respiratory: No shortness of breath, dyspnea on exertion, cough, or hemoptysis  Cardiovascular: negative  Gastrointestinal: negative  Genitourinary: negative  Musculoskeletal: negative  Neurologic: negative  Psychiatric: negative  Hematologic/Lymphatic/Immunologic: negative  Endocrine: negative      PHYSICAL EXAM  B/P: 151/86, T: Data Unavailable, P: 89, R: 16  Wt Readings from Last 3 Encounters:   08/23/22 68.4 kg (150 lb 14.4 oz)   04/05/22 67.9 kg (149 lb 12.8 oz)   01/05/22 65.8 kg (145 lb)       ECOG PPS1        Physical Exam  Vitals reviewed.   Constitutional:       Appearance: Normal appearance. She is normal weight.   HENT:      Head: Normocephalic and atraumatic.   Eyes:      Extraocular Movements: Extraocular movements intact.      Conjunctiva/sclera: Conjunctivae normal.      Pupils: Pupils are equal, round, and reactive to light.   Cardiovascular:      Rate and Rhythm: Normal rate and regular rhythm.   Pulmonary:      Effort: Pulmonary effort is normal.      Breath sounds: Normal breath sounds.   Abdominal:      General: Abdomen is flat.      Palpations: Abdomen is soft.   Musculoskeletal:         General: Normal range of motion.      Cervical back: Normal range of motion.   Skin:     General: Skin is warm.   Neurological:      General: No focal deficit present.      Mental Status: She is alert and oriented to person, place, and time. Mental status is at baseline.   Psychiatric:          Behavior: Behavior normal.         Thought Content: Thought content normal.         Judgment: Judgment normal.      breast: pectus excavatum, left breast surgically absent, no local region disease. Right breast no masses , no nipple discharge axilla benign.     LABORATORY AND IMAGING STUDIES  Recent Labs   Lab Test 07/15/22  0714 04/05/22  0959 11/19/21  1230    142 140   POTASSIUM 4.5 4.4 4.5   CHLORIDE 101 105 104   CO2 29 33* 33*   ANIONGAP 9 4 3   BUN 26.6* 28 34*   CR 1.07* 0.94 0.90   GLC 89 91 92   DANELLE 9.4 9.3 9.1     No results for input(s): MAG, PHOS in the last 43389 hours.  Recent Labs   Lab Test 07/15/22  0714 11/19/21  1230   WBC 6.0 8.7   HGB 14.3 12.4    161   MCV 92 94   NEUTROPHIL  --  64     Recent Labs   Lab Test 07/15/22  0714 04/05/22  0959 11/19/21  1230   BILITOTAL 0.3 0.4 0.3   ALKPHOS 92 82 75   ALT 19 27 23   AST 32 24 27   ALBUMIN 4.1 3.8 3.5     TSH   Date Value Ref Range Status   07/15/2022 2.93 0.30 - 4.20 uIU/mL Final     No results for input(s): CEA in the last 78497 hours.  Results for orders placed or performed in visit on 07/25/22   MA Screening Digital Right    Narrative    RIGHT FULL FIELD DIGITAL SCREENING MAMMOGRAM    Performed on: 7/25/22    Compared to: 09/01/2021 and 07/22/2021    Technique: This study was evaluated with the assistance of Computer-Aided   Detection.    Findings: The patient is status post left mastectomy. The breast is almost   entirely fatty. There are benign appearing calcifications in the right   breast.  There is no radiographic evidence of malignancy in the right breast.     IMPRESSION: ACR BI-RADS Category 2: Negative    RECOMMENDED FOLLOW-UP: Annual routine screening mammogram    The results and recommendations of this examination will be communicated   to the patient.    I have personally reviewed the examination and initial interpretation  and I agree with the findings.          ASSESSMENT  AND RECOMMENDATIONS:    IMP    1. [id number]  ER+CA+Ygr1utmlsfrarawcgv breast cancer of the left breast s/p mastectomy  2. Major Depression    Plan    1. Mammogram was unremarkable. Repeat July 2023  2. Next dexa due January 2024  3. Continue arimidex  4. We discussed her carpal tunnel it does not seem to be causing that much of a problem and she will use the brace and let me know if it gets worse and we can consider changing to aromasin.  5. RTC for exam in 6 months.    Marjorie Anguiano MD on 12/20/2022 at 11:27 AM                Again, thank you for allowing me to participate in the care of your patient.        Sincerely,        Marjorie Anguiano MD

## 2022-12-20 NOTE — PROGRESS NOTES
[facility] PHYSICIANS  MEDICAL ONCOLOGY    RETURN PATIENT VISIT NOTE    Reason for visit: breast cancer    Oncology Treatment Summary  1. 7/22/21 screening mammogram with Left breast mass in the LIQ  2. 8/19/21 ultrasound with 1.9 x 2.2 1.1 cm mass, bx invasive mammary cancer  ER90%+ AK 90%+ Her2 grisel 3+ IHC  3. 10/22/21 Left mastectomy 18 mm IDC G2, margins negative, 0/3 SLN. Ki 67 7%  4. Germline genetic testing done in [place], results pending.  5. Echo in [place] done with EF 70%  6. Started anastrozole 11/2021     HISTORY OF PRESENTING ILLNESS  Pleasant 82 year old presents with her son. She continues on her anastrozole.    She is having some issues with carpal tunnel on her right wrist. 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 She functions fine. She is thinking about getting a brace for it.  She has not otherwise had new issues.     PAST MEDICAL HISTORY  Depression - major, osteopenia, pectus excavatum, RIGHT THR      CURRENT OUTPATIENT MEDICATIONS  Current Outpatient Medications   Medication     acetaminophen (TYLENOL) 325 MG tablet     amLODIPine (NORVASC) 5 MG tablet     amoxicillin (AMOXIL) 500 MG capsule     anastrozole (ARIMIDEX) 1 MG tablet     apixaban ANTICOAGULANT (ELIQUIS ANTICOAGULANT) 5 MG tablet     ARIPiprazole (ABILIFY) 5 MG tablet     calcium carbonate 600 mg-vitamin D 400 units (CALTRATE) 600-400 MG-UNIT per tablet     cholecalciferol 25 MCG (1000 UT) TABS     furosemide (LASIX) 20 MG tablet     mirtazapine (REMERON) 15 MG tablet     Multiple Vitamins-Minerals (OCUVITE-LUTEIN PO)     nystatin (MYCOSTATIN) 338590 UNIT/GM external cream     nystatin POWD     raloxifene (EVISTA) 60 MG tablet     Specialty Vitamins Products (VITAMINS FOR HAIR) CAPS     venlafaxine (EFFEXOR-XR) 150 MG 24 hr capsule     No current facility-administered medications for this visit.        ALLERGIES   No Known Allergies     SOCIAL HISTORY  ,  3 children Leonila Bojorquez,  Norma. 6 grand kids. No tobacco rare etoh. Son is with her today.     FAMILY HISTORY  See genetic counselor note.     REVIEW OF SYSTEMS  Review Of Systems  Skin: negative  Eyes: negative  Ears/Nose/Throat: negative  Respiratory: No shortness of breath, dyspnea on exertion, cough, or hemoptysis  Cardiovascular: negative  Gastrointestinal: negative  Genitourinary: negative  Musculoskeletal: negative  Neurologic: negative  Psychiatric: negative  Hematologic/Lymphatic/Immunologic: negative  Endocrine: negative      PHYSICAL EXAM  B/P: 151/86, T: Data Unavailable, P: 89, R: 16  Wt Readings from Last 3 Encounters:   08/23/22 68.4 kg (150 lb 14.4 oz)   04/05/22 67.9 kg (149 lb 12.8 oz)   01/05/22 65.8 kg (145 lb)       ECOG PPS1        Physical Exam  Vitals reviewed.   Constitutional:       Appearance: Normal appearance. She is normal weight.   HENT:      Head: Normocephalic and atraumatic.   Eyes:      Extraocular Movements: Extraocular movements intact.      Conjunctiva/sclera: Conjunctivae normal.      Pupils: Pupils are equal, round, and reactive to light.   Cardiovascular:      Rate and Rhythm: Normal rate and regular rhythm.   Pulmonary:      Effort: Pulmonary effort is normal.      Breath sounds: Normal breath sounds.   Abdominal:      General: Abdomen is flat.      Palpations: Abdomen is soft.   Musculoskeletal:         General: Normal range of motion.      Cervical back: Normal range of motion.   Skin:     General: Skin is warm.   Neurological:      General: No focal deficit present.      Mental Status: She is alert and oriented to person, place, and time. Mental status is at baseline.   Psychiatric:         Behavior: Behavior normal.         Thought Content: Thought content normal.         Judgment: Judgment normal.      breast: pectus excavatum, left breast surgically absent, no local region disease. Right breast no masses , no nipple discharge axilla benign.     LABORATORY AND IMAGING STUDIES  Recent Labs    Lab Test 07/15/22  0714 04/05/22  0959 11/19/21  1230    142 140   POTASSIUM 4.5 4.4 4.5   CHLORIDE 101 105 104   CO2 29 33* 33*   ANIONGAP 9 4 3   BUN 26.6* 28 34*   CR 1.07* 0.94 0.90   GLC 89 91 92   DANELLE 9.4 9.3 9.1     No results for input(s): MAG, PHOS in the last 43495 hours.  Recent Labs   Lab Test 07/15/22  0714 11/19/21  1230   WBC 6.0 8.7   HGB 14.3 12.4    161   MCV 92 94   NEUTROPHIL  --  64     Recent Labs   Lab Test 07/15/22  0714 04/05/22  0959 11/19/21  1230   BILITOTAL 0.3 0.4 0.3   ALKPHOS 92 82 75   ALT 19 27 23   AST 32 24 27   ALBUMIN 4.1 3.8 3.5     TSH   Date Value Ref Range Status   07/15/2022 2.93 0.30 - 4.20 uIU/mL Final     No results for input(s): CEA in the last 80267 hours.  Results for orders placed or performed in visit on 07/25/22   MA Screening Digital Right    Narrative    RIGHT FULL FIELD DIGITAL SCREENING MAMMOGRAM    Performed on: 7/25/22    Compared to: 09/01/2021 and 07/22/2021    Technique: This study was evaluated with the assistance of Computer-Aided   Detection.    Findings: The patient is status post left mastectomy. The breast is almost   entirely fatty. There are benign appearing calcifications in the right   breast.  There is no radiographic evidence of malignancy in the right breast.     IMPRESSION: ACR BI-RADS Category 2: Negative    RECOMMENDED FOLLOW-UP: Annual routine screening mammogram    The results and recommendations of this examination will be communicated   to the patient.    I have personally reviewed the examination and initial interpretation  and I agree with the findings.          ASSESSMENT  AND RECOMMENDATIONS:    IMP    1. C1tN2C3 ER+WI+Jya3ficgjoaywceebb breast cancer of the left breast s/p mastectomy  2. Major Depression    Plan    1. Mammogram was unremarkable. Repeat July 2023  2. Next dexa due January 2024  3. Continue arimidex  4. We discussed her carpal tunnel it does not seem to be causing that much of a problem and she will  use the brace and let me know if it gets worse and we can consider changing to aromasin.  5. RTC for exam in 6 months.    Marjorie Anguiano MD on 12/20/2022 at 11:27 AM

## 2022-12-20 NOTE — NURSING NOTE
Oncology Rooming Note    December 20, 2022 10:12 AM   Alyssa Aparicio is a 82 year old female who presents for:    Chief Complaint   Patient presents with     RECHECK     Initial Vitals: /77 (BP Location: Left arm, Patient Position: Sitting, Cuff Size: Adult Regular)   Pulse 82   Temp 97.6  F (36.4  C) (Oral)   Wt 70.3 kg (155 lb)   SpO2 91%   BMI 30.27 kg/m   Estimated body mass index is 30.27 kg/m  as calculated from the following:    Height as of 8/23/22: 1.524 m (5').    Weight as of this encounter: 70.3 kg (155 lb). Body surface area is 1.73 meters squared.  No Pain (0) Comment: Data Unavailable   No LMP recorded. Patient is postmenopausal.  Allergies reviewed: Yes  Medications reviewed: Yes    Medications: Medication refills not needed today.  Pharmacy name entered into EPIC: [facility] - [place] - [address]    Clinical concerns: Some tingling in her hands       Jennifer Barrios RN

## 2023-01-29 ENCOUNTER — HEALTH MAINTENANCE LETTER (OUTPATIENT)
Age: 83
End: 2023-01-29

## 2023-07-12 NOTE — PROGRESS NOTES
Oncology Follow Up Visit: July 13, 2023    Oncologist: Dr Marjorie Anguiano  PCP: Abstract, Provider    Diagnosis: Left Breast Cancer  Alyssa Aparicio is an 82 yo female breast cancer found with 7/22/21 screening mammogram with Left breast mass in the LIQ  8/19/21 ultrasound with 1.9 x 2.2 1.1 cm mass, bx invasive mammary cancer  ER90%+ [place] 90%+ Her2 grisel 3+ IHC  *Germline genetic testing done in [place], results pending.  Treatment:   10/22/21 Left mastectomy 18 mm IDC G2, margins negative, 0/3 SLN. Ki 67 7%  -Echo in [place] done with EF 70%  12/3/2021 began Arimidex    Interval History: Ms. Aparicio comes to clinic with son for review of use of  Arimidex to treat her breast cancer. Pt shares she has no side effects from plan- denies all usual side effects and states she is able to continue on with plan. Admits that she is not as active. Eating well with good appetite and no weight loss. No new breast or chest issues.   Son is very helpful with answers as needed.   Rest of comprehensive and complete ROS is reviewed and is negative.   No past medical history on file.  Current Outpatient Medications   Medication     acetaminophen (TYLENOL) 325 MG tablet     amLODIPine (NORVASC) 5 MG tablet     anastrozole (ARIMIDEX) 1 MG tablet     apixaban ANTICOAGULANT (ELIQUIS ANTICOAGULANT) 5 MG tablet     ARIPiprazole (ABILIFY) 5 MG tablet     calcium carbonate 600 mg-vitamin D 400 units (CALTRATE) 600-400 MG-UNIT per tablet     cholecalciferol 25 MCG (1000 UT) TABS     losartan (COZAAR) 50 MG tablet     mirtazapine (REMERON) 15 MG tablet     Multiple Vitamins-Minerals (OCUVITE-LUTEIN PO)     nystatin (MYCOSTATIN) 145380 UNIT/GM external cream     nystatin POWD     venlafaxine (EFFEXOR-XR) 150 MG 24 hr capsule     amoxicillin (AMOXIL) 500 MG capsule     furosemide (LASIX) 20 MG tablet     raloxifene (EVISTA) 60 MG tablet     Specialty Vitamins Products (VITAMINS FOR HAIR) CAPS     No current facility-administered medications  for this visit.      No Known Allergies  Social history:   , lives in assisted living in area - from [place]. 3 children Reno, Leonila, Norma. 6 grand kids. No tobacco, rare etoh.    Physical Exam:/80 (BP Location: Right arm)   Pulse 84   Resp 16   Ht 1.524 m (5')   Wt 68 kg (150 lb)   SpO2 90%   BMI 29.29 kg/m     ECOG PS- 1  Constitutional: Alert and in no distress.   ENT: Eyes bright , No mouth sores  Neck: Supple, No adenopathy.  Cardiac: Heart rate and rhythm is regular and strong without murmur  Respiratory: Breathing easy. Lung sounds clear to auscultation  Breasts:Left chest with no new masses or tenderness. Right breast without masses or discharge, tenderness or new issues.   GI: Abdomen is soft, non-tender, BS normal. No masses or organomegaly  MS: Muscle tone fair- uses walker to get around but able to get up to exam table with light assist, extremities normal with no edema.   Skin: has many age spots- none obviously suspicious.   Neuro: Sensory grossly WNL, gait normal.   Lymph: Normal ant/post cervical, axillary, supraclavicular nodes  Psych: Mentation appears normal and affect normal/bright and smiling.    Laboratory/ Imaging Results:   None needed today     Assessment and Plan:   Left Breast Cancer- Pt has completed left mastectomy in 10/2021. Chose to forgo any chemotherapy and continue with Arimidex and denies all side effects including hot flashes with the medication.   She will continue on with the daily Arimidex- given to her by nursing at [facility].   She will return in 6 months to see Dr Anguiano with check on hepatic panel with start of AI. Reviewed follow up schedule for the cancer. .   Osteopenia- Dexa scan completed 1/3/2022 proved osteopenia. She is at higher risk for bone loss with use of Arimidex - Previously using Evista for help with bones.Also reports good daily diary intake and daily calcium plus vitamin D supplement.Will repeat DEXA in 2024  Moderate  recurrent depression- Seeing psychlogist and using abilify, remeron and effexor. Very cooperative and pleasant.   The total time of this encounter amounted to 45 minutes. This time included face to face time spent with the patient, prep work, ordering tests, and performing post visit documentation.  Constance Jolley,Cnp

## 2023-07-13 ENCOUNTER — ONCOLOGY VISIT (OUTPATIENT)
Dept: ONCOLOGY | Facility: CLINIC | Age: 83
End: 2023-07-13
Attending: INTERNAL MEDICINE
Payer: MEDICARE

## 2023-07-13 VITALS
HEART RATE: 84 BPM | SYSTOLIC BLOOD PRESSURE: 131 MMHG | RESPIRATION RATE: 16 BRPM | WEIGHT: 150 LBS | BODY MASS INDEX: 29.45 KG/M2 | HEIGHT: 60 IN | OXYGEN SATURATION: 90 % | DIASTOLIC BLOOD PRESSURE: 80 MMHG

## 2023-07-13 DIAGNOSIS — Z79.811 USE OF AROMATASE INHIBITORS: ICD-10-CM

## 2023-07-13 DIAGNOSIS — Z78.0 MENOPAUSE: ICD-10-CM

## 2023-07-13 DIAGNOSIS — Z17.0 MALIGNANT NEOPLASM OF UPPER-INNER QUADRANT OF LEFT BREAST IN FEMALE, ESTROGEN RECEPTOR POSITIVE (H): Primary | ICD-10-CM

## 2023-07-13 DIAGNOSIS — C50.212 MALIGNANT NEOPLASM OF UPPER-INNER QUADRANT OF LEFT BREAST IN FEMALE, ESTROGEN RECEPTOR POSITIVE (H): Primary | ICD-10-CM

## 2023-07-13 DIAGNOSIS — M85.80 OSTEOPENIA, UNSPECIFIED LOCATION: ICD-10-CM

## 2023-07-13 PROCEDURE — 99215 OFFICE O/P EST HI 40 MIN: CPT | Performed by: NURSE PRACTITIONER

## 2023-07-13 ASSESSMENT — PAIN SCALES - GENERAL: PAINLEVEL: NO PAIN (0)

## 2023-07-13 NOTE — LETTER
7/13/2023         RE: Alyssa Aparicio  [address]  [facility] [address]  [facility] [place] [address]        Dear Colleague,    Thank you for referring your patient, Alyssa Aparicio, to the [facility]. Please see a copy of my visit note below.    Oncology Follow Up Visit: July 13, 2023    Oncologist: Dr Marjorie Anguiano  PCP: Abstract, Provider    Diagnosis: Left Breast Cancer  Alyssa Aparicio is an 82 yo female breast cancer found with 7/22/21 screening mammogram with Left breast mass in the LIQ  8/19/21 ultrasound with 1.9 x 2.2 1.1 cm mass, bx invasive mammary cancer  ER90%+ MT 90%+ Her2 grisel 3+ IHC  *Germline genetic testing done in [place], results pending.  Treatment:   10/22/21 Left mastectomy 18 mm IDC G2, margins negative, 0/3 SLN. Ki 67 7%  -Echo in [place] done with EF 70%  12/3/2021 began Arimidex    Interval History: Ms. Aparicio comes to clinic with son for review of use of  Arimidex to treat her breast cancer. Pt shares she has no side effects from plan- denies all usual side effects and states she is able to continue on with plan. Admits that she is not as active. Eating well with good appetite and no weight loss. No new breast or chest issues.   Son is very helpful with answers as needed.   Rest of comprehensive and complete ROS is reviewed and is negative.   No past medical history on file.  Current Outpatient Medications   Medication     acetaminophen (TYLENOL) 325 MG tablet     amLODIPine (NORVASC) 5 MG tablet     anastrozole (ARIMIDEX) 1 MG tablet     apixaban ANTICOAGULANT (ELIQUIS ANTICOAGULANT) 5 MG tablet     ARIPiprazole (ABILIFY) 5 MG tablet     calcium carbonate 600 mg-vitamin D 400 units (CALTRATE) 600-400 MG-UNIT per tablet     cholecalciferol 25 MCG (1000 UT) TABS     losartan (COZAAR) 50 MG tablet     mirtazapine (REMERON) 15 MG tablet     Multiple Vitamins-Minerals (OCUVITE-LUTEIN PO)     nystatin (MYCOSTATIN) 018511 UNIT/GM  external cream     nystatin POWD     venlafaxine (EFFEXOR-XR) 150 MG 24 hr capsule     amoxicillin (AMOXIL) 500 MG capsule     furosemide (LASIX) 20 MG tablet     raloxifene (EVISTA) 60 MG tablet     Specialty Vitamins Products (VITAMINS FOR HAIR) CAPS     No current facility-administered medications for this visit.      No Known Allergies  Social history:   , lives in assisted living in area - from [place]. 3 children Reno, Leonila, Norma. 6 grand kids. No tobacco, rare etoh.    Physical Exam:/80 (BP Location: Right arm)   Pulse 84   Resp 16   Ht 1.524 m (5')   Wt 68 kg (150 lb)   SpO2 90%   BMI 29.29 kg/m     ECOG PS- 1  Constitutional: Alert and in no distress.   ENT: Eyes bright , No mouth sores  Neck: Supple, No adenopathy.  Cardiac: Heart rate and rhythm is regular and strong without murmur  Respiratory: Breathing easy. Lung sounds clear to auscultation  Breasts:Left chest with no new masses or tenderness. Right breast without masses or discharge, tenderness or new issues.   GI: Abdomen is soft, non-tender, BS normal. No masses or organomegaly  MS: Muscle tone fair- uses walker to get around but able to get up to exam table with light assist, extremities normal with no edema.   Skin: has many age spots- none obviously suspicious.   Neuro: Sensory grossly WNL, gait normal.   Lymph: Normal ant/post cervical, axillary, supraclavicular nodes  Psych: Mentation appears normal and affect normal/bright and smiling.    Laboratory/ Imaging Results:   None needed today     Assessment and Plan:   Left Breast Cancer- Pt has completed left mastectomy in 10/2021. Chose to forgo any chemotherapy and continue with Arimidex and denies all side effects including hot flashes with the medication.   She will continue on with the daily Arimidex- given to her by nursing at [facility].   She will return in 6 months to see Dr Anguiano with check on hepatic panel with start of AI. Reviewed follow up schedule for  the cancer. .   Osteopenia- Dexa scan completed 1/3/2022 proved osteopenia. She is at higher risk for bone loss with use of Arimidex - Previously using Evista for help with bones.Also reports good daily diary intake and daily calcium plus vitamin D supplement.Will repeat DEXA in 2024  Moderate recurrent depression- Seeing psychlogist and using abilify, remeron and effexor. Very cooperative and pleasant.   The total time of this encounter amounted to 45 minutes. This time included face to face time spent with the patient, prep work, ordering tests, and performing post visit documentation.  Constance Jolley Cnp        Again, thank you for allowing me to participate in the care of your patient.        Sincerely,        Constance Jolley, SUSANA, APRN CNP

## 2023-07-13 NOTE — NURSING NOTE
Oncology Rooming Note    July 13, 2023 2:20 PM   Alyssa Aparicio is a 83 year old female who presents for:    Chief Complaint   Patient presents with     Oncology Clinic Visit     6 month follow up     Initial Vitals: /80 (BP Location: Right arm)   Pulse 84   Resp 16   Ht 1.524 m (5')   Wt 68 kg (150 lb)   SpO2 90%   BMI 29.29 kg/m   Estimated body mass index is 29.29 kg/m  as calculated from the following:    Height as of this encounter: 1.524 m (5').    Weight as of this encounter: 68 kg (150 lb). Body surface area is 1.7 meters squared.  No Pain (0) Comment: Data Unavailable   No LMP recorded. Patient is postmenopausal.  Allergies reviewed: Yes  Medications reviewed: Yes    Medications: Medication refills not needed today.  Pharmacy name entered into [employer] [facility]: [facility] - [place] - [id number] [facility]    Clinical concerns: No new concerns         Anabelle Redd LPN

## 2023-07-26 ENCOUNTER — ANCILLARY PROCEDURE (OUTPATIENT)
Dept: MAMMOGRAPHY | Facility: CLINIC | Age: 83
End: 2023-07-26
Payer: MEDICARE

## 2023-07-26 DIAGNOSIS — C50.012 MALIGNANT NEOPLASM OF NIPPLE OF LEFT BREAST IN FEMALE, UNSPECIFIED ESTROGEN RECEPTOR STATUS (H): ICD-10-CM

## 2023-07-26 DIAGNOSIS — Z90.12 HISTORY OF LEFT MASTECTOMY: ICD-10-CM

## 2023-07-26 DIAGNOSIS — Z12.31 SCREENING MAMMOGRAM, ENCOUNTER FOR: ICD-10-CM

## 2023-07-26 PROCEDURE — 77067 SCR MAMMO BI INCL CAD: CPT | Mod: 52 | Performed by: STUDENT IN AN ORGANIZED HEALTH CARE EDUCATION/TRAINING PROGRAM

## 2024-01-08 ENCOUNTER — ANCILLARY PROCEDURE (OUTPATIENT)
Dept: BONE DENSITY | Facility: CLINIC | Age: 84
End: 2024-01-08
Attending: NURSE PRACTITIONER
Payer: MEDICARE

## 2024-01-08 DIAGNOSIS — Z17.0 MALIGNANT NEOPLASM OF UPPER-INNER QUADRANT OF LEFT BREAST IN FEMALE, ESTROGEN RECEPTOR POSITIVE (H): ICD-10-CM

## 2024-01-08 DIAGNOSIS — M85.80 OSTEOPENIA, UNSPECIFIED LOCATION: ICD-10-CM

## 2024-01-08 DIAGNOSIS — C50.212 MALIGNANT NEOPLASM OF UPPER-INNER QUADRANT OF LEFT BREAST IN FEMALE, ESTROGEN RECEPTOR POSITIVE (H): ICD-10-CM

## 2024-01-08 DIAGNOSIS — Z79.811 USE OF AROMATASE INHIBITORS: ICD-10-CM

## 2024-01-08 DIAGNOSIS — Z78.0 MENOPAUSE: ICD-10-CM

## 2024-01-08 PROCEDURE — 77080 DXA BONE DENSITY AXIAL: CPT | Performed by: RADIOLOGY

## 2024-01-11 ENCOUNTER — ONCOLOGY VISIT (OUTPATIENT)
Dept: ONCOLOGY | Facility: CLINIC | Age: 84
End: 2024-01-11
Attending: NURSE PRACTITIONER
Payer: MEDICARE

## 2024-01-11 VITALS
SYSTOLIC BLOOD PRESSURE: 131 MMHG | DIASTOLIC BLOOD PRESSURE: 81 MMHG | BODY MASS INDEX: 28.55 KG/M2 | WEIGHT: 146.2 LBS | HEART RATE: 78 BPM | OXYGEN SATURATION: 90 %

## 2024-01-11 DIAGNOSIS — M85.80 OSTEOPENIA, UNSPECIFIED LOCATION: ICD-10-CM

## 2024-01-11 DIAGNOSIS — Z17.0 MALIGNANT NEOPLASM OF UPPER-INNER QUADRANT OF LEFT BREAST IN FEMALE, ESTROGEN RECEPTOR POSITIVE (H): ICD-10-CM

## 2024-01-11 DIAGNOSIS — Z79.811 USE OF AROMATASE INHIBITORS: ICD-10-CM

## 2024-01-11 DIAGNOSIS — Z12.31 ENCOUNTER FOR SCREENING MAMMOGRAM FOR BREAST CANCER: Primary | ICD-10-CM

## 2024-01-11 DIAGNOSIS — C50.212 MALIGNANT NEOPLASM OF UPPER-INNER QUADRANT OF LEFT BREAST IN FEMALE, ESTROGEN RECEPTOR POSITIVE (H): ICD-10-CM

## 2024-01-11 PROCEDURE — 99215 OFFICE O/P EST HI 40 MIN: CPT | Performed by: NURSE PRACTITIONER

## 2024-01-11 PROCEDURE — G0463 HOSPITAL OUTPT CLINIC VISIT: HCPCS | Performed by: NURSE PRACTITIONER

## 2024-01-11 RX ORDER — ANASTROZOLE 1 MG/1
1 TABLET ORAL DAILY
Qty: 90 TABLET | Refills: 3 | Status: SHIPPED | OUTPATIENT
Start: 2024-01-11

## 2024-01-11 NOTE — LETTER
1/11/2024         RE: Alyssa Aparicio  [address]  [facility] [address]  [facility] [place] [address]        Dear Colleague,    Thank you for referring your patient, Alyssa Aparicio, to the [facility]. Please see a copy of my visit note below.    Oncology Follow Up Visit: January 11, 2024     Oncologist: Dr Marjorie Anguiano  PCP: Abstract, Provider    Diagnosis: Left Breast Cancer  Alyssa Aparicio is an 85 yo female breast cancer found with 7/22/21 screening mammogram with Left breast mass in the LIQ  8/19/21 ultrasound with 1.9 x 2.2 1.1 cm mass, bx invasive mammary cancer  ER90%+ [place] 90%+ Her2 grisel 3+ IHC  *Germline genetic testing done in [place], results pending.  Treatment:   10/22/21 Left mastectomy 18 mm IDC G2, margins negative, 0/3 SLN. Ki 67 7%  -Echo in [place] done with EF 70%  12/3/2021 began Arimidex    Interval History: Ms. Aparicio comes to clinic with son for review of use of  Arimidex to treat her breast cancer.Pt now 2 years into use of AI and reports no side effects related to plan that she can recall and denies hot flashes, sleep issues, vaginal irritation or new joint aches that can be related to the drug-though does have ongoing degenerative disease.  She states she is eating well and bowels can be constipated at times but this is not unusual for her and not related to the AI.    Denies any new breast or chest issues  Rest of comprehensive and complete ROS is reviewed and is negative.   Past Medical History:   Diagnosis Date     Breast cancer (H)      Current Outpatient Medications   Medication     acetaminophen (TYLENOL) 325 MG tablet     amLODIPine (NORVASC) 5 MG tablet     amoxicillin (AMOXIL) 500 MG capsule     anastrozole (ARIMIDEX) 1 MG tablet     apixaban ANTICOAGULANT (ELIQUIS ANTICOAGULANT) 5 MG tablet     ARIPiprazole (ABILIFY) 5 MG tablet     calcium carbonate 600 mg-vitamin D 400 units (CALTRATE) 600-400 MG-UNIT per tablet      cholecalciferol 25 MCG (1000 UT) TABS     losartan (COZAAR) 50 MG tablet     mirtazapine (REMERON) 15 MG tablet     Multiple Vitamins-Minerals (OCUVITE-LUTEIN PO)     nystatin (MYCOSTATIN) 988847 UNIT/GM external cream     venlafaxine (EFFEXOR-XR) 150 MG 24 hr capsule     furosemide (LASIX) 20 MG tablet     nystatin POWD     Specialty Vitamins Products (VITAMINS FOR HAIR) CAPS     No current facility-administered medications for this visit.      No Known Allergies  Social history:   , lives in assisted living in area - from [place]. 3 children Reno, Leonila, Norma. 6 grand kids. No tobacco, rare etoh.    Physical Exam:/81   Pulse 78   Wt 66.3 kg (146 lb 3.2 oz)   SpO2 90%   BMI 28.55 kg/m     ECOG PS- 1  Constitutional: Alert and in no distress.   ENT: Eyes bright, No mouth sores  Neck: Supple, No adenopathy.  Cardiac: Heart rate and rhythm is regular and strong without murmur  Respiratory: Breathing easy. Lung sounds clear to auscultation  Breasts:Left chest with no new masses or tenderness. Right breast without masses or discharge, tenderness or new issues.   GI: Abdomen is soft, non-tender, BS normal. No masses or organomegaly  MS: Muscle tone fair- uses walker to get around. Poor posture. Extremities normal with compression hose with R>L edema.   Skin: has many age spots- none obviously suspicious.   Neuro: Sensory grossly WNL, gait normal.   Lymph: Normal ant/post cervical, axillary, supraclavicular nodes  Psych: Mentation appears normal and affect normal/bright and can converse well.     Laboratory/ Imaging Results:   1/8/2024 DEXA scan:  HISTORY:    Osteopenia     COMPARISON:  1/3/2022     Age: 83 years.  Height: 60 inches  Weight: 150 pounds  Sex: Female  Ethnicity: white  Referring Provider: ADE WORRELL     Image quality: Adequate     Lumbar spine T-score in region of L1-L = 0.8      HIPS:  Left total hip T-score: -1.7  Left total hip percent change: -4.6%, significant      Left  femoral neck T-score = -2.2     Radius 33% T-score = -0.8  Radius 33% percent change: 1.8%      COMPARISON TO PRIOR:  Bone mineral density in the left total hip has decreased based on a  95% confidence interval.     FRAX:  10 year probability of major osteoporotic fracture: 31.7%  10 year probability of hip fracture: 21.7%  The 10 year probability of fracture may be lower than reported if the  patient has received treatment. FRAX data should be disregarded in  patient's taking bisphosphonates.     World Health Organization definition of osteoporosis and osteopenia  for  women:   Normal: T-score at or above -1.0  Low Bone Mass (Osteopenia): T-score between -1.0 and -2.5.   Osteoporosis: T-score at or below -2.5   T-scores are reported for postmenopausal women and men over 50 years  of age.                                                                      IMPRESSION:  Low bone mass (osteopenia) with decreased bone mineral  density in the left total hip.     MAKENZIE LAWRENCE MD         Assessment and Plan:   Left Breast Cancer- Pt has completed left mastectomy in 10/2021. Chose to forgo any chemotherapy and continue with Arimidex and denies all side effects including hot flashes with the medication.   She will continue on with the daily Arimidex- given to her by nursing at [facility].   She will return in 6 months to see Dr Anguiano.  No labs necessary but will need right breast mammogram  Osteopenia- Dexa scan completed 1/3/2022 proved osteopenia- Repeat 1/3/2024 with continued osteopenia except noted additional bone loss to left hip.   She is at higher risk for bone loss with use of Arimidex - Previously using Evista for help with bones but is no longer on this medication.  Taking daily calcium plus vitamin D and encouraged increased dairy intake.  Will repeat DEXA in 2026  The total time of this encounter amounted to 40 minutes. This time included face to face time spent with the patient, prep work,  ordering tests, and performing post visit documentation.  Constance Jloley Cnp      Again, thank you for allowing me to participate in the care of your patient.        Sincerely,        Constance Jolley NP, APRN CNP

## 2024-01-11 NOTE — NURSING NOTE
Oncology Rooming Note    January 11, 2024 1:54 PM   Alyssa Aparicio is a 83 year old female who presents for:    Chief Complaint   Patient presents with    Oncology Clinic Visit     Follow Up     Initial Vitals: BP (!) 159/90   Pulse 78   Wt 66.3 kg (146 lb 3.2 oz)   SpO2 90%   BMI 28.55 kg/m   Estimated body mass index is 28.55 kg/m  as calculated from the following:    Height as of 7/13/23: 1.524 m (5').    Weight as of this encounter: 66.3 kg (146 lb 3.2 oz). Body surface area is 1.68 meters squared.  Data Unavailable Comment: Data Unavailable   No LMP recorded. Patient is postmenopausal.  Allergies reviewed: Yes  Medications reviewed: Yes    Medications: Medication refills not needed today.  Pharmacy name entered into [facility]: [facility] - [place] - [address] [facility] [address]    Frailty Screening:   Is the patient here for a new oncology consult visit in cancer care? 2. No      Clinical concerns: No Concerns       Srinath Durant MA

## 2024-01-11 NOTE — PROGRESS NOTES
Oncology Follow Up Visit: January 11, 2024     Oncologist: Dr Marjorie Anguiano  PCP: Abstract, Provider    Diagnosis: Left Breast Cancer  Alyssa Aparicio is an 83 yo female breast cancer found with 7/22/21 screening mammogram with Left breast mass in the LIQ  8/19/21 ultrasound with 1.9 x 2.2 1.1 cm mass, bx invasive mammary cancer  ER90%+ [place] 90%+ Her2 grisel 3+ IHC  *Germline genetic testing done in [place], results pending.  Treatment:   10/22/21 Left mastectomy 18 mm IDC G2, margins negative, 0/3 SLN. Ki 67 7%  -Echo in [place] done with EF 70%  12/3/2021 began Arimidex    Interval History: Ms. Aparicio comes to clinic with son for review of use of  Arimidex to treat her breast cancer.Pt now 2 years into use of AI and reports no side effects related to plan that she can recall and denies hot flashes, sleep issues, vaginal irritation or new joint aches that can be related to the drug-though does have ongoing degenerative disease.  She states she is eating well and bowels can be constipated at times but this is not unusual for her and not related to the AI.    Denies any new breast or chest issues  Rest of comprehensive and complete ROS is reviewed and is negative.   Past Medical History:   Diagnosis Date    Breast cancer (H)      Current Outpatient Medications   Medication    acetaminophen (TYLENOL) 325 MG tablet    amLODIPine (NORVASC) 5 MG tablet    amoxicillin (AMOXIL) 500 MG capsule    anastrozole (ARIMIDEX) 1 MG tablet    apixaban ANTICOAGULANT (ELIQUIS ANTICOAGULANT) 5 MG tablet    ARIPiprazole (ABILIFY) 5 MG tablet    calcium carbonate 600 mg-vitamin D 400 units (CALTRATE) 600-400 MG-UNIT per tablet    cholecalciferol 25 MCG (1000 UT) TABS    losartan (COZAAR) 50 MG tablet    mirtazapine (REMERON) 15 MG tablet    Multiple Vitamins-Minerals (OCUVITE-LUTEIN PO)    nystatin (MYCOSTATIN) 970240 UNIT/GM external cream    venlafaxine (EFFEXOR-XR) 150 MG 24 hr capsule    furosemide (LASIX) 20 MG tablet     nystatin POWD    Specialty Vitamins Products (VITAMINS FOR HAIR) CAPS     No current facility-administered medications for this visit.      No Known Allergies  Social history:   , lives in assisted living in area - from [place]. 3 children Reno, Leonila, Noram. 6 grand kids. No tobacco, rare etoh.    Physical Exam:/81   Pulse 78   Wt 66.3 kg (146 lb 3.2 oz)   SpO2 90%   BMI 28.55 kg/m     ECOG PS- 1  Constitutional: Alert and in no distress.   ENT: Eyes bright, No mouth sores  Neck: Supple, No adenopathy.  Cardiac: Heart rate and rhythm is regular and strong without murmur  Respiratory: Breathing easy. Lung sounds clear to auscultation  Breasts:Left chest with no new masses or tenderness. Right breast without masses or discharge, tenderness or new issues.   GI: Abdomen is soft, non-tender, BS normal. No masses or organomegaly  MS: Muscle tone fair- uses walker to get around. Poor posture. Extremities normal with compression hose with R>L edema.   Skin: has many age spots- none obviously suspicious.   Neuro: Sensory grossly WNL, gait normal.   Lymph: Normal ant/post cervical, axillary, supraclavicular nodes  Psych: Mentation appears normal and affect normal/bright and can converse well.     Laboratory/ Imaging Results:   1/8/2024 DEXA scan:  HISTORY:    Osteopenia     COMPARISON:  1/3/2022     Age: 83 years.  Height: 60 inches  Weight: 150 pounds  Sex: Female  Ethnicity: white  Referring Provider: ADE WORRELL     Image quality: Adequate     Lumbar spine T-score in region of L1-L = 0.8      HIPS:  Left total hip T-score: -1.7  Left total hip percent change: -4.6%, significant      Left femoral neck T-score = -2.2     Radius 33% T-score = -0.8  Radius 33% percent change: 1.8%      COMPARISON TO PRIOR:  Bone mineral density in the left total hip has decreased based on a  95% confidence interval.     FRAX:  10 year probability of major osteoporotic fracture: 31.7%  10 year probability of hip  fracture: 21.7%  The 10 year probability of fracture may be lower than reported if the  patient has received treatment. FRAX data should be disregarded in  patient's taking bisphosphonates.     World Health Organization definition of osteoporosis and osteopenia  for  women:   Normal: T-score at or above -1.0  Low Bone Mass (Osteopenia): T-score between -1.0 and -2.5.   Osteoporosis: T-score at or below -2.5   T-scores are reported for postmenopausal women and men over 50 years  of age.                                                                      IMPRESSION:  Low bone mass (osteopenia) with decreased bone mineral  density in the left total hip.     MAKENZIE LAWRENCE MD         Assessment and Plan:   Left Breast Cancer- Pt has completed left mastectomy in 10/2021. Chose to forgo any chemotherapy and continue with Arimidex and denies all side effects including hot flashes with the medication.   She will continue on with the daily Arimidex- given to her by nursing at [facility].   She will return in 6 months to see Dr Anguiano.  No labs necessary but will need right breast mammogram  Osteopenia- Dexa scan completed 1/3/2022 proved osteopenia- Repeat 1/3/2024 with continued osteopenia except noted additional bone loss to left hip.   She is at higher risk for bone loss with use of Arimidex - Previously using Evista for help with bones but is no longer on this medication.  Taking daily calcium plus vitamin D and encouraged increased dairy intake.  Will repeat DEXA in 2026  The total time of this encounter amounted to 40 minutes. This time included face to face time spent with the patient, prep work, ordering tests, and performing post visit documentation.  Constance Jolley,Cnp

## 2024-03-02 ENCOUNTER — HEALTH MAINTENANCE LETTER (OUTPATIENT)
Age: 84
End: 2024-03-02

## 2024-07-30 ENCOUNTER — ANCILLARY PROCEDURE (OUTPATIENT)
Dept: MAMMOGRAPHY | Facility: CLINIC | Age: 84
End: 2024-07-30
Attending: NURSE PRACTITIONER
Payer: MEDICARE

## 2024-07-30 DIAGNOSIS — Z12.31 ENCOUNTER FOR SCREENING MAMMOGRAM FOR BREAST CANCER: ICD-10-CM

## 2024-07-30 PROCEDURE — 77067 SCR MAMMO BI INCL CAD: CPT | Mod: 52 | Performed by: RADIOLOGY

## 2024-07-31 NOTE — PROGRESS NOTES
Oncology Follow Up Visit: August 1, 2024     Oncologist: Dr Marjorie Anguiano  PCP: Abstract, Provider    Diagnosis: Left Breast Cancer  Alyssa Aparicio is an 85 yo female breast cancer found with 7/22/21 screening mammogram with Left breast mass in the LIQ  8/19/21 ultrasound with 1.9 x 2.2 1.1 cm mass, bx invasive mammary cancer  ER90%+ CT 90%+ Her2 grisel 3+ IHC  *Germline genetic testing done in [place], results pending.  Treatment:   10/22/21 Left mastectomy 18 mm IDC G2, margins negative, 0/3 SLN. Ki 67 7%  -Echo in [place] done with EF 70%  12/3/2021 began Arimidex    Interval History: Ms. Aparicio comes to clinic with son for review of use of  Arimidex to treat her breast cancer.Pt states she has moved to assisted living and is doing well without recent illness or falls. She denies new breast or chest issues. Feels she is eating well with stable weight. Has no new chest or breast issues. Meds are given to her daily by facility.  Rest of comprehensive and complete ROS is reviewed and is negative.   Past Medical History:   Diagnosis Date    Breast cancer (H)      Current Outpatient Medications   Medication Sig Dispense Refill    acetaminophen (TYLENOL) 325 MG tablet Take 650 mg by mouth      amLODIPine (NORVASC) 5 MG tablet TAKE 1 Tablet AT 8AM      amoxicillin (AMOXIL) 500 MG capsule Before dental appts only      anastrozole (ARIMIDEX) 1 MG tablet Take 1 tablet (1 mg) by mouth daily 90 tablet 3    apixaban ANTICOAGULANT (ELIQUIS ANTICOAGULANT) 5 MG tablet Take 1 tablet (5 mg) by mouth 2 times daily      ARIPiprazole (ABILIFY) 5 MG tablet Take 2.5 mg by mouth      calcium carbonate 600 mg-vitamin D 400 units (CALTRATE) 600-400 MG-UNIT per tablet Take 1 tablet by mouth daily      cholecalciferol 25 MCG (1000 UT) TABS Take 1,000 Units by mouth      ferrous sulfate (FEROSUL) 325 (65 Fe) MG tablet Take 325 mg by mouth daily (with breakfast) IRON      losartan (COZAAR) 50 MG tablet       mirtazapine (REMERON) 15  MG tablet       Multiple Vitamins-Minerals (OCUVITE-LUTEIN PO) Take 1 tablet by mouth daily      nystatin (MYCOSTATIN) 206165 UNIT/GM external cream Apply topically 2 times daily Under right breast 60 g 1    nystatin POWD Apply to the affected area daily prn inflammation 1 each 1    venlafaxine (EFFEXOR-XR) 150 MG 24 hr capsule Take 300 mg by mouth      furosemide (LASIX) 20 MG tablet Take 10 mg by mouth (Patient not taking: Reported on 12/20/2022)      Specialty Vitamins Products (VITAMINS FOR HAIR) CAPS Take 1 tablet by mouth daily (Patient not taking: Reported on 7/13/2023)       No current facility-administered medications for this visit.      No Known Allergies  Social history:   , lives in assisted living in area - from [place]. 3 children [place], Leonila, Norma. 6 grand kids. No tobacco, rare etoh.    Physical Exam:BP (!) 152/85 (BP Location: Right arm)   Pulse 79   Resp 16   Wt 67 kg (147 lb 12.8 oz)   SpO2 91%   BMI 28.87 kg/m     ECOG PS- 1  Constitutional: Alert, elderly and in no distress. Cooperative.   ENT: Eyes bright, No mouth sores. [place]  Neck: Supple, No adenopathy.  Cardiac: Heart rate and rhythm is regular and strong without murmur  Respiratory: Breathing easy. Lung sounds clear to auscultation  Breasts:Left chest with no new masses or tenderness. Right breast without masses or discharge, tenderness or new issues.   GI: Abdomen is soft, non-tender, BS normal. No masses or organomegaly  MS: Muscle tone fair- uses walker to get around. Poor posture. Extremities normal with compression hose with R>L edema.   Skin: has many age spots- none obviously suspicious. Has redness and moisture to low sternum- no obvious fungal infection but prime for this with poor posture.   Neuro: Sensory grossly WNL, gait normal.   Lymph: Normal ant/post cervical, axillary, supraclavicular nodes  Psych: Mentation appears normal and affect normal/bright and can converse well.     Laboratory/ Imaging Results:    1/8/2024 DEXA scan:  HISTORY:    Osteopenia     COMPARISON:  1/3/2022     Age: 83 years.  Height: 60 inches  Weight: 150 pounds  Sex: Female  Ethnicity: white  Referring Provider: ADE WORRELL     Image quality: Adequate     Lumbar spine T-score in region of L1-L = 0.8      HIPS:  Left total hip T-score: -1.7  Left total hip percent change: -4.6%, significant      Left femoral neck T-score = -2.2     Radius 33% T-score = -0.8  Radius 33% percent change: 1.8%      COMPARISON TO PRIOR:  Bone mineral density in the left total hip has decreased based on a  95% confidence interval.     FRAX:  10 year probability of major osteoporotic fracture: 31.7%  10 year probability of hip fracture: 21.7%  The 10 year probability of fracture may be lower than reported if the  patient has received treatment. FRAX data should be disregarded in  patient's taking bisphosphonates.     World Health Organization definition of osteoporosis and osteopenia  for  women:   Normal: T-score at or above -1.0  Low Bone Mass (Osteopenia): T-score between -1.0 and -2.5.   Osteoporosis: T-score at or below -2.5   T-scores are reported for postmenopausal women and men over 50 years  of age.                                                                      IMPRESSION:  Low bone mass (osteopenia) with decreased bone mineral  density in the left total hip.     MAKENZIE LAWRENCE MD    ========================================================  RIGHT FULL FIELD DIGITAL SCREENING MAMMOGRAM     Performed on: 7/30/24     Compared to: 07/26/2023, 07/25/2022, 09/01/2021, 07/22/2021, and 07/22/2021     Technique: This study was evaluated with the assistance of Computer-Aided Detection.     Findings: The patient is status post left mastectomy. The breast is almost entirely fatty. There is no radiographic evidence of malignancy.                                                                    IMPRESSION: ACR BI-RADS Category 1: Negative     BREAST  "CANCER SCREENING RECOMMENDATION: Routine yearly mammography beginning at age 40 or as discussed with your provider.     The results and recommendations of this examination will be communicated to the patient.           Anamaria Taylor MD     Assessment and Plan:   Left Breast Cancer- Pt has completed left mastectomy in 10/2021. Chose to forgo any chemotherapy and continues with Arimidex since 12/2021.  No significant side effects of AI  Plan for 5 years of AI use as able. - given to her by nursing at Assisted living.   7/30/2024 right breast mammogram was negative- repeat annually.   She will return in 6 months to see Dr Anguiano.  No labs or imaging necessary   -encouraged best posture to prevent skin infection to area lower sturnum- in \"cave\" area. May use corn starch or antifungal powder   Osteopenia- Dexa scan completed 1/3/2022 proved osteopenia- Repeat 1/3/2024 with continued osteopenia except noted additional bone loss to left hip.   Discussed options for treatment for bones but chose against medication for now.   Previously using Evista for help with bones but is no longer on this medication.   Continue calcium + vitamin D daily and regular dairy use   Encouraged daily exercise.   Taking daily calcium plus vitamin D and encouraged increased dairy intake.  Will repeat DEXA in 2026    The total time of this encounter amounted to 40 minutes. This time included face to face time spent with the patient, prep work, ordering tests, and performing post visit documentation.  Constance Jolley,Bri    "

## 2024-08-01 ENCOUNTER — ONCOLOGY VISIT (OUTPATIENT)
Dept: ONCOLOGY | Facility: CLINIC | Age: 84
End: 2024-08-01
Attending: NURSE PRACTITIONER
Payer: MEDICARE

## 2024-08-01 VITALS
RESPIRATION RATE: 16 BRPM | HEART RATE: 79 BPM | OXYGEN SATURATION: 91 % | DIASTOLIC BLOOD PRESSURE: 85 MMHG | WEIGHT: 147.8 LBS | SYSTOLIC BLOOD PRESSURE: 152 MMHG | BODY MASS INDEX: 28.87 KG/M2

## 2024-08-01 DIAGNOSIS — C50.212 MALIGNANT NEOPLASM OF UPPER-INNER QUADRANT OF LEFT BREAST IN FEMALE, ESTROGEN RECEPTOR POSITIVE (H): ICD-10-CM

## 2024-08-01 DIAGNOSIS — Z12.31 ENCOUNTER FOR SCREENING MAMMOGRAM FOR BREAST CANCER: ICD-10-CM

## 2024-08-01 DIAGNOSIS — Z79.811 USE OF AROMATASE INHIBITORS: ICD-10-CM

## 2024-08-01 DIAGNOSIS — M85.80 OSTEOPENIA, UNSPECIFIED LOCATION: ICD-10-CM

## 2024-08-01 DIAGNOSIS — Z17.0 MALIGNANT NEOPLASM OF UPPER-INNER QUADRANT OF LEFT BREAST IN FEMALE, ESTROGEN RECEPTOR POSITIVE (H): ICD-10-CM

## 2024-08-01 PROCEDURE — G0463 HOSPITAL OUTPT CLINIC VISIT: HCPCS | Performed by: NURSE PRACTITIONER

## 2024-08-01 PROCEDURE — 99215 OFFICE O/P EST HI 40 MIN: CPT | Performed by: NURSE PRACTITIONER

## 2024-08-01 RX ORDER — FERROUS SULFATE 325(65) MG
325 TABLET ORAL
COMMUNITY

## 2024-08-01 ASSESSMENT — PAIN SCALES - GENERAL: PAINLEVEL: NO PAIN (0)

## 2024-08-01 NOTE — NURSING NOTE
Oncology Rooming Note    August 1, 2024 2:45 PM   Alyssa Aparicio is a 84 year old female who presents for:    Chief Complaint   Patient presents with    Oncology Clinic Visit     6 month follow up     Initial Vitals: BP (!) 152/85 (BP Location: Right arm)   Pulse 79   Resp 16   Wt 67 kg (147 lb 12.8 oz)   SpO2 91%   BMI 28.87 kg/m   Estimated body mass index is 28.87 kg/m  as calculated from the following:    Height as of 7/13/23: 1.524 m (5').    Weight as of this encounter: 67 kg (147 lb 12.8 oz). Body surface area is 1.68 meters squared.  No Pain (0) Comment: Data Unavailable   No LMP recorded. Patient is postmenopausal.  Allergies reviewed: Yes  Medications reviewed: Yes    Medications: Medication refills not needed today.  Pharmacy name entered into [employer]: [facility] - [facility] [address] [facility]    Frailty Screening:   Is the patient here for a new oncology consult visit in cancer care? 2. No      Clinical concerns: No new concerns         Anabelle Redd LPN

## 2024-08-01 NOTE — LETTER
8/1/2024      Alyssa Aparicio  [address]  [facility] [address]  [place] [address]      Dear Colleague,    Thank you for referring your patient, Alyssa Aparicio, to the [facility]. Please see a copy of my visit note below.    Oncology Follow Up Visit: August 1, 2024     Oncologist: Dr Marjorie Anguiano  PCP: Abstract, Provider    Diagnosis: Left Breast Cancer  Alyssa Aparicio is an 85 yo female breast cancer found with 7/22/21 screening mammogram with Left breast mass in the LIQ  8/19/21 ultrasound with 1.9 x 2.2 1.1 cm mass, bx invasive mammary cancer  ER90%+ UT 90%+ Her2 grisel 3+ IHC  *Germline genetic testing done in [place], results pending.  Treatment:   10/22/21 Left mastectomy 18 mm IDC G2, margins negative, 0/3 SLN. Ki 67 7%  -Echo in [place] done with EF 70%  12/3/2021 began Arimidex    Interval History: Ms. Aparicio comes to clinic with son for review of use of  Arimidex to treat her breast cancer.Pt states she has moved to assisted living and is doing well without recent illness or falls. She denies new breast or chest issues. Feels she is eating well with stable weight. Has no new chest or breast issues. Meds are given to her daily by facility.  Rest of comprehensive and complete ROS is reviewed and is negative.   Past Medical History:   Diagnosis Date     Breast cancer (H)      Current Outpatient Medications   Medication Sig Dispense Refill     acetaminophen (TYLENOL) 325 MG tablet Take 650 mg by mouth       amLODIPine (NORVASC) 5 MG tablet TAKE 1 Tablet AT 8AM       amoxicillin (AMOXIL) 500 MG capsule Before dental appts only       anastrozole (ARIMIDEX) 1 MG tablet Take 1 tablet (1 mg) by mouth daily 90 tablet 3     apixaban ANTICOAGULANT (ELIQUIS ANTICOAGULANT) 5 MG tablet Take 1 tablet (5 mg) by mouth 2 times daily       ARIPiprazole (ABILIFY) 5 MG tablet Take 2.5 mg by mouth       calcium carbonate 600 mg-vitamin D 400 units (CALTRATE)  600-400 MG-UNIT per tablet Take 1 tablet by mouth daily       cholecalciferol 25 MCG (1000 UT) TABS Take 1,000 Units by mouth       ferrous sulfate (FEROSUL) 325 (65 Fe) MG tablet Take 325 mg by mouth daily (with breakfast) IRON       losartan (COZAAR) 50 MG tablet        mirtazapine (REMERON) 15 MG tablet        Multiple Vitamins-Minerals (OCUVITE-LUTEIN PO) Take 1 tablet by mouth daily       nystatin (MYCOSTATIN) 432949 UNIT/GM external cream Apply topically 2 times daily Under right breast 60 g 1     nystatin POWD Apply to the affected area daily prn inflammation 1 each 1     venlafaxine (EFFEXOR-XR) 150 MG 24 hr capsule Take 300 mg by mouth       furosemide (LASIX) 20 MG tablet Take 10 mg by mouth (Patient not taking: Reported on 12/20/2022)       Specialty Vitamins Products (VITAMINS FOR HAIR) CAPS Take 1 tablet by mouth daily (Patient not taking: Reported on 7/13/2023)       No current facility-administered medications for this visit.      No Known Allergies  Social history:   , lives in assisted living in area - from [place]. 3 children [place], Leonila, Norma. 6 grand kids. No tobacco, rare etoh.    Physical Exam:BP (!) 152/85 (BP Location: Right arm)   Pulse 79   Resp 16   Wt 67 kg (147 lb 12.8 oz)   SpO2 91%   BMI 28.87 kg/m     ECOG PS- 1  Constitutional: Alert, elderly and in no distress. Cooperative.   ENT: Eyes bright, No mouth sores. [place]  Neck: Supple, No adenopathy.  Cardiac: Heart rate and rhythm is regular and strong without murmur  Respiratory: Breathing easy. Lung sounds clear to auscultation  Breasts:Left chest with no new masses or tenderness. Right breast without masses or discharge, tenderness or new issues.   GI: Abdomen is soft, non-tender, BS normal. No masses or organomegaly  MS: Muscle tone fair- uses walker to get around. Poor posture. Extremities normal with compression hose with R>L edema.   Skin: has many age spots- none obviously suspicious. Has redness and moisture to low  sternum- no obvious fungal infection but prime for this with poor posture.   Neuro: Sensory grossly WNL, gait normal.   Lymph: Normal ant/post cervical, axillary, supraclavicular nodes  Psych: Mentation appears normal and affect normal/bright and can converse well.     Laboratory/ Imaging Results:   1/8/2024 DEXA scan:  HISTORY:    Osteopenia     COMPARISON:  1/3/2022     Age: 83 years.  Height: 60 inches  Weight: 150 pounds  Sex: Female  Ethnicity: white  Referring Provider: ADE WORRELL     Image quality: Adequate     Lumbar spine T-score in region of L1-L = 0.8      HIPS:  Left total hip T-score: -1.7  Left total hip percent change: -4.6%, significant      Left femoral neck T-score = -2.2     Radius 33% T-score = -0.8  Radius 33% percent change: 1.8%      COMPARISON TO PRIOR:  Bone mineral density in the left total hip has decreased based on a  95% confidence interval.     FRAX:  10 year probability of major osteoporotic fracture: 31.7%  10 year probability of hip fracture: 21.7%  The 10 year probability of fracture may be lower than reported if the  patient has received treatment. FRAX data should be disregarded in  patient's taking bisphosphonates.     World Health Organization definition of osteoporosis and osteopenia  for  women:   Normal: T-score at or above -1.0  Low Bone Mass (Osteopenia): T-score between -1.0 and -2.5.   Osteoporosis: T-score at or below -2.5   T-scores are reported for postmenopausal women and men over 50 years  of age.                                                                      IMPRESSION:  Low bone mass (osteopenia) with decreased bone mineral  density in the left total hip.     MAKENZIE LAWRENCE MD    ========================================================  RIGHT FULL FIELD DIGITAL SCREENING MAMMOGRAM     Performed on: 7/30/24     Compared to: 07/26/2023, 07/25/2022, 09/01/2021, 07/22/2021, and 07/22/2021     Technique: This study was evaluated with the assistance of  "Computer-Aided Detection.     Findings: The patient is status post left mastectomy. The breast is almost entirely fatty. There is no radiographic evidence of malignancy.                                                                    IMPRESSION: ACR BI-RADS Category 1: Negative     BREAST CANCER SCREENING RECOMMENDATION: Routine yearly mammography beginning at age 40 or as discussed with your provider.     The results and recommendations of this examination will be communicated to the patient.           Anamaria Taylor MD     Assessment and Plan:   Left Breast Cancer- Pt has completed left mastectomy in 10/2021. Chose to forgo any chemotherapy and continues with Arimidex since 12/2021.  No significant side effects of AI  Plan for 5 years of AI use as able. - given to her by nursing at Assisted living.   7/30/2024 right breast mammogram was negative- repeat annually.   She will return in 6 months to see Dr Anguiano.  No labs or imaging necessary   -encouraged best posture to prevent skin infection to area lower sturnum- in \"cave\" area. May use corn starch or antifungal powder   Osteopenia- Dexa scan completed 1/3/2022 proved osteopenia- Repeat 1/3/2024 with continued osteopenia except noted additional bone loss to left hip.   Discussed options for treatment for bones but chose against medication for now.   Previously using Evista for help with bones but is no longer on this medication.   Continue calcium + vitamin D daily and regular dairy use   Encouraged daily exercise.   Taking daily calcium plus vitamin D and encouraged increased dairy intake.  Will repeat DEXA in 2026    The total time of this encounter amounted to 40 minutes. This time included face to face time spent with the patient, prep work, ordering tests, and performing post visit documentation.  Constance Jolley Cnp      Again, thank you for allowing me to participate in the care of your patient.        Sincerely,        Constance Jolley, SUSANA, " APRN CNP

## 2024-12-18 ENCOUNTER — PATIENT OUTREACH (OUTPATIENT)
Dept: CARE COORDINATION | Facility: CLINIC | Age: 84
End: 2024-12-18
Payer: MEDICARE

## 2025-01-28 NOTE — PROGRESS NOTES
"Oncology Follow Up Visit: January 30, 2025     Oncologist: Dr Anguiano  PCP: Abstract, Provider    Diagnosis: Left Breast Cancer  Alyssa Aparicio is an 83 yo female breast cancer found with 7/22/21 screening mammogram with Left breast mass in the LIQ  8/19/21 ultrasound with 1.9 x 2.2 1.1 cm mass, bx invasive mammary cancer  ER90%+ MS 90%+ Her2 grisel 3+ IHC  *Germline genetic testing done in [place], results pending.  Treatment:   10/22/21 Left mastectomy 18 mm IDC G2, margins negative, 0/3 SLN. Ki 67 7%  -Echo in [place] done with EF 70%  12/3/2021 began Arimidex    Interval History: Ms. Aparicio comes to clinic with son for review of use of Arimidex to treat her breast cancer.States she is feeling well today and has had no recent illness, falls or complaints other than a  \"Canker sore\" x 1 month to lower and upper right side of medial jaws- denies any dental pain and is eating well with mild weight gain.   Starting PT at her assisted living.  She denies new breast or chest issues.   Has no new chest or breast issues.   Meds are given to her daily by facility.  Rest of comprehensive and complete ROS is reviewed and is negative.   Past Medical History:   Diagnosis Date    Breast cancer (H)      Current Outpatient Medications   Medication Sig Dispense Refill    acetaminophen (TYLENOL) 325 MG tablet Take 650 mg by mouth      amLODIPine (NORVASC) 5 MG tablet TAKE 1 Tablet AT 8AM      amoxicillin (AMOXIL) 500 MG capsule Before dental appts only      anastrozole (ARIMIDEX) 1 MG tablet Take 1 tablet (1 mg) by mouth daily 90 tablet 3    apixaban ANTICOAGULANT (ELIQUIS ANTICOAGULANT) 5 MG tablet Take 1 tablet (5 mg) by mouth 2 times daily      ARIPiprazole (ABILIFY) 5 MG tablet Take 2.5 mg by mouth      calcium carbonate 600 mg-vitamin D 400 units (CALTRATE) 600-400 MG-UNIT per tablet Take 1 tablet by mouth daily      cholecalciferol 25 MCG (1000 UT) TABS Take 1,000 Units by mouth      ferrous sulfate (FEROSUL) 325 (65 " Fe) MG tablet Take 325 mg by mouth daily (with breakfast) IRON      furosemide (LASIX) 20 MG tablet Take 10 mg by mouth.      losartan (COZAAR) 50 MG tablet       mirtazapine (REMERON) 15 MG tablet       Multiple Vitamins-Minerals (OCUVITE-LUTEIN PO) Take 1 tablet by mouth daily      nystatin (MYCOSTATIN) 504356 UNIT/GM external cream Apply topically 2 times daily Under right breast 60 g 1    nystatin POWD Apply to the affected area daily prn inflammation 1 each 1    Specialty Vitamins Products (VITAMINS FOR HAIR) CAPS Take 1 tablet by mouth daily.      venlafaxine (EFFEXOR-XR) 150 MG 24 hr capsule Take 300 mg by mouth       No current facility-administered medications for this visit.      No Known Allergies  Social history:   , lives in assisted living in area - from [place]. 3 children [place], Leonila, Norma. 6 grand kids. No tobacco, rare etoh.    Physical Exam:BP (!) 148/85   Pulse 81   Resp 16   Wt 67.1 kg (148 lb)   SpO2 (!) 90%   BMI 28.90 kg/m     ECOG PS- 1  Constitutional: Alert, elderly and in no distress. Cooperative.   ENT: Eyes bright, Has some redness to the lower right side of the jaw toward front.  [place]  Neck: Supple, No adenopathy.  Cardiac: Heart rate and rhythm is regular and strong without murmur  Respiratory: Breathing easy but has Pectus excavatum Lung sounds clear to auscultation  Breasts:Left chest with no new masses or tenderness. Right breast without masses or discharge, tenderness or new issues.   GI: Abdomen is soft, non-tender, BS normal. No masses or organomegaly  MS: Muscle tone fair- uses walker to get around. Poor posture. Extremities normal with compression hose with R>L edema.   Skin: has many age spots- none obviously suspicious. Has redness and moisture to low sternum- no obvious fungal infection at this time  Neuro: Sensory grossly WNL, gait shuffles with walker.   Lymph: Normal ant/post cervical, axillary, supraclavicular nodes  Psych: Mentation appears stilted and  affect normal and can converse well.   Son is very supportive.    Laboratory/ Imaging Results:   No results found for any visits on 01/30/25.    Assessment and Plan:   Left Breast Cancer- Pt completed left mastectomy in 10/2021. Chose to forgo any chemotherapy and continues with Arimidex since 12/2021.  No significant side effects of AI noted by patient  Plan for 5 years of AI use as able - administered by nursing at Assisted Living.   7/30/2024 right breast mammogram was negative- repeat annually.   She will return in 6 months with mammogram prior.    - No labs or imaging necessary     Osteopenia- DEXA scan completed 1/3/2022 proved osteopenia-   Repeat DEXA1/3/2024 with continued osteopenia except noted additional bone loss to left hip.   Discussed options for treatment for bones but chose against medication for now.   Previously used Evista for help with bones but is no longer on this medication.   Continue calcium + vitamin D daily and regular dairy use   Encouraged daily exercise- glad to see she is back in PT.    Will consider repeat DEXA in 2026    Mouth sore- reviewed as possible gum issue- recommended dental exam which was missed in last months.     The total time of this encounter amounted to 30 minutes. This time included face to face time spent with the patient, prep work, ordering tests, and performing post visit documentation.  Constance Jolley,Cnp

## 2025-01-30 ENCOUNTER — ONCOLOGY VISIT (OUTPATIENT)
Dept: ONCOLOGY | Facility: CLINIC | Age: 85
End: 2025-01-30
Attending: NURSE PRACTITIONER
Payer: MEDICARE

## 2025-01-30 VITALS
DIASTOLIC BLOOD PRESSURE: 85 MMHG | WEIGHT: 148 LBS | HEART RATE: 81 BPM | RESPIRATION RATE: 16 BRPM | OXYGEN SATURATION: 90 % | BODY MASS INDEX: 28.9 KG/M2 | SYSTOLIC BLOOD PRESSURE: 148 MMHG

## 2025-01-30 DIAGNOSIS — Z79.811 USE OF AROMATASE INHIBITORS: ICD-10-CM

## 2025-01-30 DIAGNOSIS — M85.80 OSTEOPENIA, UNSPECIFIED LOCATION: ICD-10-CM

## 2025-01-30 DIAGNOSIS — Z12.31 ENCOUNTER FOR SCREENING MAMMOGRAM FOR BREAST CANCER: ICD-10-CM

## 2025-01-30 DIAGNOSIS — C50.212 MALIGNANT NEOPLASM OF UPPER-INNER QUADRANT OF LEFT BREAST IN FEMALE, ESTROGEN RECEPTOR POSITIVE (H): ICD-10-CM

## 2025-01-30 DIAGNOSIS — K13.79 MOUTH SORE: Primary | ICD-10-CM

## 2025-01-30 DIAGNOSIS — Z17.0 MALIGNANT NEOPLASM OF UPPER-INNER QUADRANT OF LEFT BREAST IN FEMALE, ESTROGEN RECEPTOR POSITIVE (H): ICD-10-CM

## 2025-01-30 PROCEDURE — G0463 HOSPITAL OUTPT CLINIC VISIT: HCPCS | Performed by: NURSE PRACTITIONER

## 2025-01-30 ASSESSMENT — PAIN SCALES - GENERAL: PAINLEVEL_OUTOF10: NO PAIN (0)

## 2025-01-30 NOTE — NURSING NOTE
Oncology Rooming Note    January 30, 2025 2:44 PM   Alyssa Aparicio is a 84 year old female who presents for:    Chief Complaint   Patient presents with    Oncology Clinic Visit     6 month follow up     Initial Vitals: BP (!) 148/85   Pulse 81   Resp 16   Wt 67.1 kg (148 lb)   SpO2 (!) 90%   BMI 28.90 kg/m   Estimated body mass index is 28.9 kg/m  as calculated from the following:    Height as of 7/13/23: 1.524 m (5').    Weight as of this encounter: 67.1 kg (148 lb). Body surface area is 1.69 meters squared.  No Pain (0) Comment: Data Unavailable   No LMP recorded. Patient is postmenopausal.  Allergies reviewed: Yes  Medications reviewed: Yes    Medications: Medication refills not needed today.  Pharmacy name entered into [facility]: [facility] - [place] - [address] [facility]    Frailty Screening:   Is the patient here for a new oncology consult visit in cancer care? 2. No      Clinical concerns: No new concerns       Anabelle Redd LPN

## 2025-01-30 NOTE — LETTER
"1/30/2025      Alyssa Aparicio  [address]  [facility] [address]  [facility] [address]      Dear Colleague,    Thank you for referring your patient, Alyssa Aparicio, to the [facility]. Please see a copy of my visit note below.    Oncology Follow Up Visit: January 30, 2025     Oncologist: Dr Anguiano  PCP: Abstract, Provider    Diagnosis: Left Breast Cancer  Alyssa Aparicio is an 83 yo female breast cancer found with 7/22/21 screening mammogram with Left breast mass in the [facility]  8/19/21 ultrasound with 1.9 x 2.2 1.1 cm mass, bx invasive mammary cancer  ER90%+ AR 90%+ Her2 grisel 3+ IHC  *Germline genetic testing done in [place], results pending.  Treatment:   10/22/21 Left mastectomy 18 mm IDC G2, margins negative, 0/3 SLN. Ki 67 7%  -Echo in [place] done with EF 70%  12/3/2021 began Arimidex    Interval History: Ms. Aparicio comes to clinic with son for review of use of Arimidex to treat her breast cancer.States she is feeling well today and has had no recent illness, falls or complaints other than a  \"Canker sore\" x 1 month to lower and upper right side of medial jaws- denies any dental pain and is eating well with mild weight gain.   Starting PT at her assisted living.  She denies new breast or chest issues.   Has no new chest or breast issues.   Meds are given to her daily by facility.  Rest of comprehensive and complete ROS is reviewed and is negative.   Past Medical History:   Diagnosis Date     Breast cancer (H)      Current Outpatient Medications   Medication Sig Dispense Refill     acetaminophen (TYLENOL) 325 MG tablet Take 650 mg by mouth       amLODIPine (NORVASC) 5 MG tablet TAKE 1 Tablet AT 8AM       amoxicillin (AMOXIL) 500 MG capsule Before dental appts only       anastrozole (ARIMIDEX) 1 MG tablet Take 1 tablet (1 mg) by mouth daily 90 tablet 3     apixaban ANTICOAGULANT (ELIQUIS ANTICOAGULANT) 5 MG tablet Take 1 tablet (5 mg) by mouth 2 " times daily       ARIPiprazole (ABILIFY) 5 MG tablet Take 2.5 mg by mouth       calcium carbonate 600 mg-vitamin D 400 units (CALTRATE) 600-400 MG-UNIT per tablet Take 1 tablet by mouth daily       cholecalciferol 25 MCG (1000 UT) TABS Take 1,000 Units by mouth       ferrous sulfate (FEROSUL) 325 (65 Fe) MG tablet Take 325 mg by mouth daily (with breakfast) IRON       furosemide (LASIX) 20 MG tablet Take 10 mg by mouth.       losartan (COZAAR) 50 MG tablet        mirtazapine (REMERON) 15 MG tablet        Multiple Vitamins-Minerals (OCUVITE-LUTEIN PO) Take 1 tablet by mouth daily       nystatin (MYCOSTATIN) 324309 UNIT/GM external cream Apply topically 2 times daily Under right breast 60 g 1     nystatin POWD Apply to the affected area daily prn inflammation 1 each 1     Specialty Vitamins Products (VITAMINS FOR HAIR) CAPS Take 1 tablet by mouth daily.       venlafaxine (EFFEXOR-XR) 150 MG 24 hr capsule Take 300 mg by mouth       No current facility-administered medications for this visit.      No Known Allergies  Social history:   , lives in assisted living in area - from [place]. 3 children [place], Leonila, Norma. 6 grand kids. No tobacco, rare etoh.    Physical Exam:BP (!) 148/85   Pulse 81   Resp 16   Wt 67.1 kg (148 lb)   SpO2 (!) 90%   BMI 28.90 kg/m     ECOG PS- 1  Constitutional: Alert, elderly and in no distress. Cooperative.   ENT: Eyes bright, Has some redness to the lower right side of the jaw toward front.  [place]  Neck: Supple, No adenopathy.  Cardiac: Heart rate and rhythm is regular and strong without murmur  Respiratory: Breathing easy but has Pectus excavatum Lung sounds clear to auscultation  Breasts:Left chest with no new masses or tenderness. Right breast without masses or discharge, tenderness or new issues.   GI: Abdomen is soft, non-tender, BS normal. No masses or organomegaly  MS: Muscle tone fair- uses walker to get around. Poor posture. Extremities normal with compression hose with  R>L edema.   Skin: has many age spots- none obviously suspicious. Has redness and moisture to low sternum- no obvious fungal infection at this time  Neuro: Sensory grossly WNL, gait shuffles with walker.   Lymph: Normal ant/post cervical, axillary, supraclavicular nodes  Psych: Mentation appears stilted and affect normal and can converse well.   Son is very supportive.    Laboratory/ Imaging Results:   No results found for any visits on 01/30/25.    Assessment and Plan:   Left Breast Cancer- Pt completed left mastectomy in 10/2021. Chose to forgo any chemotherapy and continues with Arimidex since 12/2021.  No significant side effects of AI noted by patient  Plan for 5 years of AI use as able - administered by nursing at Assisted Living.   7/30/2024 right breast mammogram was negative- repeat annually.   She will return in 6 months with mammogram prior.    - No labs or imaging necessary     Osteopenia- DEXA scan completed 1/3/2022 proved osteopenia-   Repeat DEXA1/3/2024 with continued osteopenia except noted additional bone loss to left hip.   Discussed options for treatment for bones but chose against medication for now.   Previously used Evista for help with bones but is no longer on this medication.   Continue calcium + vitamin D daily and regular dairy use   Encouraged daily exercise- glad to see she is back in PT.    Will consider repeat DEXA in 2026    Mouth sore- reviewed as possible gum issue- recommended dental exam which was missed in last months.     The total time of this encounter amounted to 30 minutes. This time included face to face time spent with the patient, prep work, ordering tests, and performing post visit documentation.  Constance Jolley Cnp      Again, thank you for allowing me to participate in the care of your patient.        Sincerely,        Constance Jolley, NP, APRN CNP    Electronically signed

## 2025-02-25 ENCOUNTER — LAB REQUISITION (OUTPATIENT)
Dept: LAB | Facility: CLINIC | Age: 85
End: 2025-02-25
Payer: MEDICARE

## 2025-02-25 DIAGNOSIS — R35.0 FREQUENCY OF MICTURITION: ICD-10-CM

## 2025-02-25 LAB
ALBUMIN UR-MCNC: 20 MG/DL
APPEARANCE UR: CLEAR
BILIRUB UR QL STRIP: NEGATIVE
COLOR UR AUTO: YELLOW
GLUCOSE UR STRIP-MCNC: NEGATIVE MG/DL
HGB UR QL STRIP: NEGATIVE
KETONES UR STRIP-MCNC: NEGATIVE MG/DL
LEUKOCYTE ESTERASE UR QL STRIP: ABNORMAL
MUCOUS THREADS #/AREA URNS LPF: PRESENT /LPF
NITRATE UR QL: NEGATIVE
PH UR STRIP: 6 [PH] (ref 5–7)
RBC URINE: 1 /HPF
SP GR UR STRIP: 1.02 (ref 1–1.03)
SQUAMOUS EPITHELIAL: 1 /HPF
TRANSITIONAL EPI: <1 /HPF
UROBILINOGEN UR STRIP-MCNC: NORMAL MG/DL
WBC URINE: 4 /HPF
YEAST #/AREA URNS HPF: ABNORMAL /HPF

## 2025-02-25 PROCEDURE — 81001 URINALYSIS AUTO W/SCOPE: CPT | Mod: ORL

## 2025-02-25 PROCEDURE — 87086 URINE CULTURE/COLONY COUNT: CPT | Mod: ORL

## 2025-02-26 LAB — BACTERIA UR CULT: NORMAL

## 2025-03-15 ENCOUNTER — HEALTH MAINTENANCE LETTER (OUTPATIENT)
Age: 85
End: 2025-03-15

## 2025-03-31 ENCOUNTER — LAB REQUISITION (OUTPATIENT)
Dept: LAB | Facility: CLINIC | Age: 85
End: 2025-03-31
Payer: MEDICARE

## 2025-03-31 DIAGNOSIS — N17.9 ACUTE KIDNEY FAILURE, UNSPECIFIED: ICD-10-CM

## 2025-03-31 DIAGNOSIS — F03.B4 UNSPECIFIED DEMENTIA, MODERATE, WITH ANXIETY (H): ICD-10-CM

## 2025-04-03 LAB
ALBUMIN SERPL BCG-MCNC: 4 G/DL (ref 3.5–5.2)
ALP SERPL-CCNC: 101 U/L (ref 40–150)
ALT SERPL W P-5'-P-CCNC: 22 U/L (ref 0–50)
ANION GAP SERPL CALCULATED.3IONS-SCNC: 7 MMOL/L (ref 7–15)
AST SERPL W P-5'-P-CCNC: 29 U/L (ref 0–45)
BILIRUB SERPL-MCNC: 0.5 MG/DL
BUN SERPL-MCNC: 45.3 MG/DL (ref 8–23)
CALCIUM SERPL-MCNC: 10 MG/DL (ref 8.8–10.4)
CHLORIDE SERPL-SCNC: 101 MMOL/L (ref 98–107)
CREAT SERPL-MCNC: 1.33 MG/DL (ref 0.51–0.95)
EGFRCR SERPLBLD CKD-EPI 2021: 39 ML/MIN/1.73M2
ERYTHROCYTE [DISTWIDTH] IN BLOOD BY AUTOMATED COUNT: 15 % (ref 10–15)
GLUCOSE SERPL-MCNC: 100 MG/DL (ref 70–99)
HCO3 SERPL-SCNC: 35 MMOL/L (ref 22–29)
HCT VFR BLD AUTO: 51.8 % (ref 35–47)
HGB BLD-MCNC: 15.5 G/DL (ref 11.7–15.7)
MCH RBC QN AUTO: 27.5 PG (ref 26.5–33)
MCHC RBC AUTO-ENTMCNC: 29.9 G/DL (ref 31.5–36.5)
MCV RBC AUTO: 92 FL (ref 78–100)
PLATELET # BLD AUTO: 141 10E3/UL (ref 150–450)
POTASSIUM SERPL-SCNC: 4.8 MMOL/L (ref 3.4–5.3)
PROT SERPL-MCNC: 7.1 G/DL (ref 6.4–8.3)
RBC # BLD AUTO: 5.64 10E6/UL (ref 3.8–5.2)
SODIUM SERPL-SCNC: 143 MMOL/L (ref 135–145)
TSH SERPL DL<=0.005 MIU/L-ACNC: 1.9 UIU/ML (ref 0.3–4.2)
VIT B12 SERPL-MCNC: 869 PG/ML (ref 232–1245)
WBC # BLD AUTO: 6.5 10E3/UL (ref 4–11)

## 2025-04-03 PROCEDURE — 84443 ASSAY THYROID STIM HORMONE: CPT | Mod: ORL

## 2025-04-03 PROCEDURE — 36415 COLL VENOUS BLD VENIPUNCTURE: CPT | Mod: ORL

## 2025-04-03 PROCEDURE — 85027 COMPLETE CBC AUTOMATED: CPT | Mod: ORL

## 2025-04-03 PROCEDURE — P9604 ONE-WAY ALLOW PRORATED TRIP: HCPCS | Mod: ORL

## 2025-04-03 PROCEDURE — 82607 VITAMIN B-12: CPT | Mod: ORL

## 2025-04-03 PROCEDURE — 80053 COMPREHEN METABOLIC PANEL: CPT | Mod: ORL
